# Patient Record
Sex: FEMALE | Race: WHITE | Employment: PART TIME | URBAN - METROPOLITAN AREA
[De-identification: names, ages, dates, MRNs, and addresses within clinical notes are randomized per-mention and may not be internally consistent; named-entity substitution may affect disease eponyms.]

---

## 2022-09-20 ENCOUNTER — TELEPHONE (OUTPATIENT)
Dept: UROLOGY | Age: 52
End: 2022-09-20

## 2022-09-20 ENCOUNTER — HOSPITAL ENCOUNTER (EMERGENCY)
Age: 52
Discharge: HOME | End: 2022-09-20
Attending: STUDENT IN AN ORGANIZED HEALTH CARE EDUCATION/TRAINING PROGRAM
Payer: COMMERCIAL

## 2022-09-20 ENCOUNTER — APPOINTMENT (OUTPATIENT)
Dept: CT IMAGING | Age: 52
End: 2022-09-20
Payer: COMMERCIAL

## 2022-09-20 VITALS
OXYGEN SATURATION: 94 % | HEART RATE: 76 BPM | BODY MASS INDEX: 29.89 KG/M2 | HEIGHT: 66 IN | SYSTOLIC BLOOD PRESSURE: 125 MMHG | RESPIRATION RATE: 15 BRPM | TEMPERATURE: 98.2 F | DIASTOLIC BLOOD PRESSURE: 82 MMHG | WEIGHT: 186 LBS

## 2022-09-20 DIAGNOSIS — N20.0 KIDNEY STONE: ICD-10-CM

## 2022-09-20 DIAGNOSIS — N30.01 ACUTE CYSTITIS WITH HEMATURIA: Primary | ICD-10-CM

## 2022-09-20 LAB
ALBUMIN SERPL-MCNC: 4.6 G/DL (ref 3.5–5)
ALBUMIN/GLOB SERPL: 1.6 {RATIO}
ALP SERPL-CCNC: 82 U/L (ref 45–117)
ALT SERPL-CCNC: 10 U/L (ref 13–61)
ANION GAP SERPL CALC-SCNC: 13 MMOL/L (ref 7–16)
APPEARANCE UR: ABNORMAL
AST SERPL-CCNC: 13 U/L (ref 15–37)
BACTERIA URNS QL MICRO: ABNORMAL /HPF
BASOPHILS # BLD: 0.1 K/UL (ref 0–0.2)
BASOPHILS NFR BLD: 1 % (ref 0–2)
BILIRUB SERPL-MCNC: 0.3 MG/DL (ref 0.2–1.1)
BILIRUB UR QL: NEGATIVE
BUN SERPL-MCNC: 17 MG/DL (ref 7–18)
CALCIUM SERPL-MCNC: 10 MG/DL (ref 8.3–10.4)
CASTS URNS QL MICRO: 0 /LPF
CHLORIDE SERPL-SCNC: 106 MMOL/L (ref 98–107)
CO2 SERPL-SCNC: 21 MMOL/L (ref 21–32)
COLOR UR: YELLOW
CREAT SERPL-MCNC: 0.44 MG/DL (ref 0.6–1)
CRYSTALS URNS QL MICRO: 0 /LPF
DIFFERENTIAL METHOD BLD: NORMAL
EOSINOPHIL # BLD: 0.2 K/UL (ref 0–0.8)
EOSINOPHIL NFR BLD: 2 % (ref 0.5–7.8)
EPI CELLS #/AREA URNS HPF: ABNORMAL /HPF
ERYTHROCYTE [DISTWIDTH] IN BLOOD BY AUTOMATED COUNT: 13 % (ref 11.9–14.6)
GLOBULIN SER CALC-MCNC: 2.8 G/DL (ref 2.3–3.5)
GLUCOSE SERPL-MCNC: 95 MG/DL (ref 65–100)
GLUCOSE UR STRIP.AUTO-MCNC: NEGATIVE MG/DL
HCT VFR BLD AUTO: 45.3 % (ref 35.8–46.3)
HGB BLD-MCNC: 15.4 G/DL (ref 11.7–15.4)
HGB UR QL STRIP: ABNORMAL
IMM GRANULOCYTES # BLD AUTO: 0 K/UL (ref 0–0.5)
IMM GRANULOCYTES NFR BLD AUTO: 0 % (ref 0–5)
KETONES UR QL STRIP.AUTO: NEGATIVE MG/DL
LEUKOCYTE ESTERASE UR QL STRIP.AUTO: ABNORMAL
LIPASE SERPL-CCNC: 34 U/L (ref 13–60)
LYMPHOCYTES # BLD: 1.5 K/UL (ref 0.5–4.6)
LYMPHOCYTES NFR BLD: 20 % (ref 13–44)
MCH RBC QN AUTO: 31.7 PG (ref 26.1–32.9)
MCHC RBC AUTO-ENTMCNC: 34 G/DL (ref 31.4–35)
MCV RBC AUTO: 93.2 FL (ref 79.6–97.8)
MONOCYTES # BLD: 0.8 K/UL (ref 0.1–1.3)
MONOCYTES NFR BLD: 10 % (ref 4–12)
MUCOUS THREADS URNS QL MICRO: 0 /LPF
NEUTS SEG # BLD: 4.9 K/UL (ref 1.7–8.2)
NEUTS SEG NFR BLD: 67 % (ref 43–78)
NITRITE UR QL STRIP.AUTO: NEGATIVE
NRBC # BLD: 0 K/UL (ref 0–0.2)
OTHER OBSERVATIONS: ABNORMAL
PH UR STRIP: 5.5 [PH] (ref 5–9)
PLATELET # BLD AUTO: 220 K/UL (ref 150–450)
PMV BLD AUTO: 9.6 FL (ref 9.4–12.3)
POTASSIUM SERPL-SCNC: 4.7 MMOL/L (ref 3.5–5.1)
PROT SERPL-MCNC: 7.4 G/DL (ref 6.4–8.2)
PROT UR STRIP-MCNC: 100 MG/DL
RBC # BLD AUTO: 4.86 M/UL (ref 4.05–5.2)
RBC #/AREA URNS HPF: >100 /HPF
SODIUM SERPL-SCNC: 140 MMOL/L (ref 136–145)
SP GR UR REFRACTOMETRY: >=1.03 (ref 1–1.02)
UROBILINOGEN UR QL STRIP.AUTO: 0.2 EU/DL (ref 0.2–1)
WBC # BLD AUTO: 7.4 K/UL (ref 4.3–11.1)
WBC URNS QL MICRO: >100 /HPF

## 2022-09-20 PROCEDURE — 2580000003 HC RX 258: Performed by: STUDENT IN AN ORGANIZED HEALTH CARE EDUCATION/TRAINING PROGRAM

## 2022-09-20 PROCEDURE — 96365 THER/PROPH/DIAG IV INF INIT: CPT

## 2022-09-20 PROCEDURE — 85025 COMPLETE CBC W/AUTO DIFF WBC: CPT

## 2022-09-20 PROCEDURE — 81003 URINALYSIS AUTO W/O SCOPE: CPT

## 2022-09-20 PROCEDURE — 99285 EMERGENCY DEPT VISIT HI MDM: CPT

## 2022-09-20 PROCEDURE — 6360000002 HC RX W HCPCS: Performed by: STUDENT IN AN ORGANIZED HEALTH CARE EDUCATION/TRAINING PROGRAM

## 2022-09-20 PROCEDURE — 83690 ASSAY OF LIPASE: CPT

## 2022-09-20 PROCEDURE — 96375 TX/PRO/DX INJ NEW DRUG ADDON: CPT

## 2022-09-20 PROCEDURE — 87086 URINE CULTURE/COLONY COUNT: CPT

## 2022-09-20 PROCEDURE — 6360000004 HC RX CONTRAST MEDICATION: Performed by: STUDENT IN AN ORGANIZED HEALTH CARE EDUCATION/TRAINING PROGRAM

## 2022-09-20 PROCEDURE — 80053 COMPREHEN METABOLIC PANEL: CPT

## 2022-09-20 PROCEDURE — 74177 CT ABD & PELVIS W/CONTRAST: CPT

## 2022-09-20 PROCEDURE — 81015 MICROSCOPIC EXAM OF URINE: CPT

## 2022-09-20 RX ORDER — KETOROLAC TROMETHAMINE 15 MG/ML
15 INJECTION, SOLUTION INTRAMUSCULAR; INTRAVENOUS ONCE
Status: COMPLETED | OUTPATIENT
Start: 2022-09-20 | End: 2022-09-20

## 2022-09-20 RX ORDER — SODIUM CHLORIDE 0.9 % (FLUSH) 0.9 %
5-40 SYRINGE (ML) INJECTION 2 TIMES DAILY
Status: DISCONTINUED | OUTPATIENT
Start: 2022-09-20 | End: 2022-09-20 | Stop reason: HOSPADM

## 2022-09-20 RX ORDER — HYDROCODONE BITARTRATE AND ACETAMINOPHEN 5; 325 MG/1; MG/1
1 TABLET ORAL EVERY 6 HOURS PRN
Qty: 10 TABLET | Refills: 0 | Status: SHIPPED | OUTPATIENT
Start: 2022-09-20 | End: 2022-09-23

## 2022-09-20 RX ORDER — 0.9 % SODIUM CHLORIDE 0.9 %
100 INTRAVENOUS SOLUTION INTRAVENOUS ONCE
Status: COMPLETED | OUTPATIENT
Start: 2022-09-20 | End: 2022-09-20

## 2022-09-20 RX ORDER — CEFPODOXIME PROXETIL 200 MG/1
200 TABLET, FILM COATED ORAL 2 TIMES DAILY
Qty: 20 TABLET | Refills: 0 | Status: SHIPPED | OUTPATIENT
Start: 2022-09-20 | End: 2022-09-30

## 2022-09-20 RX ADMIN — KETOROLAC TROMETHAMINE 15 MG: 15 INJECTION, SOLUTION INTRAMUSCULAR; INTRAVENOUS at 13:39

## 2022-09-20 RX ADMIN — SODIUM CHLORIDE, PRESERVATIVE FREE 10 ML: 5 INJECTION INTRAVENOUS at 13:35

## 2022-09-20 RX ADMIN — SODIUM CHLORIDE 100 ML: 9 INJECTION, SOLUTION INTRAVENOUS at 13:35

## 2022-09-20 RX ADMIN — CEFTRIAXONE 1000 MG: 1 INJECTION, POWDER, FOR SOLUTION INTRAMUSCULAR; INTRAVENOUS at 15:02

## 2022-09-20 RX ADMIN — IOPAMIDOL 100 ML: 755 INJECTION, SOLUTION INTRAVENOUS at 13:35

## 2022-09-20 ASSESSMENT — PAIN - FUNCTIONAL ASSESSMENT
PAIN_FUNCTIONAL_ASSESSMENT: ACTIVITIES ARE NOT PREVENTED
PAIN_FUNCTIONAL_ASSESSMENT: 0-10
PAIN_FUNCTIONAL_ASSESSMENT: 0-10

## 2022-09-20 ASSESSMENT — PAIN SCALES - GENERAL
PAINLEVEL_OUTOF10: 5
PAINLEVEL_OUTOF10: 4
PAINLEVEL_OUTOF10: 9

## 2022-09-20 ASSESSMENT — PAIN DESCRIPTION - ORIENTATION: ORIENTATION: LEFT

## 2022-09-20 ASSESSMENT — ENCOUNTER SYMPTOMS
ABDOMINAL PAIN: 1
PHOTOPHOBIA: 0
VOMITING: 1
SHORTNESS OF BREATH: 0
SORE THROAT: 0
NAUSEA: 1
DIARRHEA: 0
COUGH: 0

## 2022-09-20 ASSESSMENT — PAIN DESCRIPTION - ONSET: ONSET: AWAKENED FROM SLEEP

## 2022-09-20 ASSESSMENT — PAIN DESCRIPTION - DESCRIPTORS: DESCRIPTORS: ACHING;SHARP

## 2022-09-20 ASSESSMENT — PAIN DESCRIPTION - LOCATION
LOCATION: FLANK
LOCATION: FLANK

## 2022-09-20 ASSESSMENT — PAIN DESCRIPTION - FREQUENCY: FREQUENCY: CONTINUOUS

## 2022-09-20 NOTE — DISCHARGE INSTRUCTIONS
Take antibiotics as prescribed. Use Norco as needed for pain. Do not drive or operate heavy machinery as this medication can be sedating. Follow-up with nephrology tomorrow, they will call you to make a follow-up appoint. If you not hear from them by midmorning tomorrow, call their office.

## 2022-09-20 NOTE — TELEPHONE ENCOUNTER
Received page from ER about the patient listed below. She has L staghorn kidney that is symptomatic. Not clinically septic. ER setting up follow up with our office tomorrow (KEI Madrigal) to discuss next steps in stone management. I sent referral and scheduling request to our office. Douglas Alexandre M.D.     [facility]  [address], [facility] [address] [facility] [place]  Phone: [phone]  Fax: [phone]

## 2022-09-20 NOTE — ED NOTES
I have reviewed discharge instructions with the patient. The patient verbalized understanding. Patient left ED via Discharge Method: ambulatory to Home with Self. Opportunity for questions and clarification provided. Patient given 2 scripts. To continue your aftercare when you leave the hospital, you may receive an automated call from our care team to check in on how you are doing. This is a free service and part of our promise to provide the best care and service to meet your aftercare needs.  If you have questions, or wish to unsubscribe from this service please call [phone]. Thank you for Choosing our [address] [place] [address] Emergency Department.        Puma Burrows RN  09/20/22 1919

## 2022-09-20 NOTE — ED TRIAGE NOTES
Patient c/o flank pain. She had CT scan outside facility that showed kidney stone.    Unable to get an appointment with Urologist.

## 2022-09-20 NOTE — ED PROVIDER NOTES
Contrast? None    CBC with Auto Differential    CMP    Lipase    Urinalysis with Microscopic    Urinalysis    Urinalysis, Micro    Diet NPO    Saline lock IV        Medications   sodium chloride flush 0.9 % injection 5-40 mL (10 mLs IntraVENous Given 9/20/22 1335)   cefTRIAXone (ROCEPHIN) 1,000 mg in sodium chloride 0.9 % 50 mL IVPB mini-bag (1,000 mg IntraVENous New Bag 9/20/22 1502)   ketorolac (TORADOL) injection 15 mg (15 mg IntraVENous Given 9/20/22 1339)   0.9 % sodium chloride bolus (100 mLs IntraVENous New Bag 9/20/22 1335)   iopamidol (ISOVUE-370) 76 % injection 100 mL (100 mLs IntraVENous Given 9/20/22 1335)       New Prescriptions    CEFPODOXIME (VANTIN) 200 MG TABLET    Take 1 tablet by mouth 2 times daily for 10 days    HYDROCODONE-ACETAMINOPHEN (NORCO) 5-325 MG PER TABLET    Take 1 tablet by mouth every 6 hours as needed for Pain for up to 3 days. Intended supply: 3 days. Take lowest dose possible to manage pain        Jhon Overton is a 45 y.o. female who presents to the Emergency Department with chief complaint of  No chief complaint on file. 57-year-old female presents to the emergency department complaining of left flank pain that radiates into her abdomen. States has been present for multiple months. Had outpatient imaging, was diagnosed with staghorn calculi in the left kidney. States she has been trying to get in and see urology for the past 3 weeks and primary care physician has been sending referrals with no return call. Patient states she is tired of waiting so she came to the ER today. Reports continued and worsening pain to the left flank, when it gets severe it does cause her to get nauseated and vomit. Has been taking Aleve for symptoms. Does report previously taking 2 rounds of antibiotics few weeks ago but patient does not recall what antibiotics with exact timing of this. Denies fever, chills, diarrhea. Denies dysuria.         Review of Systems   Constitutional:  Negative for chills and fever. HENT:  Negative for sore throat. Eyes:  Negative for photophobia. Respiratory:  Negative for cough and shortness of breath. Cardiovascular:  Negative for chest pain. Gastrointestinal:  Positive for abdominal pain, nausea and vomiting. Negative for diarrhea. Genitourinary:  Positive for flank pain. Negative for dysuria. Musculoskeletal:  Negative for neck pain and neck stiffness. Skin:  Negative for rash. Neurological:  Negative for syncope and headaches. Psychiatric/Behavioral:  Negative for confusion. All other systems reviewed and are negative. No past medical history on file. No past surgical history on file. No family history on file. Social History     Socioeconomic History    Marital status:          Patient has no known allergies. Previous Medications    No medications on file        Vitals signs and nursing note reviewed. Patient Vitals for the past 4 hrs:   Pulse Resp BP SpO2   09/20/22 1453 76 15 125/82 94 %          Physical Exam  Vitals and nursing note reviewed. Constitutional:       General: She is not in acute distress. Appearance: Normal appearance. HENT:      Head: Normocephalic. Nose: Nose normal.      Mouth/Throat:      Mouth: Mucous membranes are moist.   Eyes:      Extraocular Movements: Extraocular movements intact. Cardiovascular:      Rate and Rhythm: Normal rate and regular rhythm. Pulses: Normal pulses. Heart sounds: Normal heart sounds. Pulmonary:      Effort: Pulmonary effort is normal. No respiratory distress. Breath sounds: Normal breath sounds. Abdominal:      General: Abdomen is flat. Palpations: Abdomen is soft. Tenderness: There is abdominal tenderness. There is left CVA tenderness. There is no right CVA tenderness, guarding or rebound. Musculoskeletal:         General: No swelling or tenderness. Normal range of motion. Cervical back: Normal range of motion. No rigidity. Skin:     General: Skin is warm. Findings: No rash. Neurological:      General: No focal deficit present. Mental Status: She is alert and oriented to person, place, and time. Psychiatric:         Mood and Affect: Mood normal.        Procedures    Results for orders placed or performed during the hospital encounter of 09/20/22   CT ABDOMEN PELVIS W IV CONTRAST Additional Contrast? None    Narrative    CT ABDOMEN AND PELVIS WITH INTRAVENOUS CONTRAST DATED 9/20/2022 3:02 PM.    History: 9/20/2022     Comparison: Left flank pain. Staghorn calculus. Technique:   Multiple contiguous helical CT images reconstructed at 5 mm  intervals were obtained from above the diaphragms through the ischial  tuberosities following 100 cc Isovue-370 without acute complication. All CT  scans performed at this facility use one or all of the following: Automated  exposure control, adjustment of the mA and/or kVp according to patient's size,  iterative reconstruction. Findings:  CT ABDOMEN:    Limited evaluation of the lung bases and base of the mediastinum demonstrates no  significant abnormalities. The Liver is homogeneous in attenuation. The spleen is homogeneous in  attenuation. No contour deforming or enhancing mass lesions are seen of the  pancreas or adrenal glands. The gallbladder has an unremarkable CT appearance  without radiopaque stones or pericholecystic fluid/inflammatory changes. No  acute changes are seen of the right kidney. There is a large stone within the  left renal pelvis measuring 2.2 cm x 0.9 cm. This obstructs the left kidney with  mild to moderate left hydronephrosis. In addition, there is clearly demonstrated  reactive urothelial thickening of the left renal pelvis some additional  urothelial thickening seen of the left renal pelvis and proximal left ureter. The visualized loops of small bowel and colon are normal in caliber.   The  appendix is seen on axial image 57 without acute abnormality. No free fluid,  free air, or focal inflammatory changes are seen in the abdomen. No adenopathy  is seen. The abdominal aorta is unremarkable in appearance. CT PELVIS:  No abnormal pelvic fluid collections or inflammatory changes are present. No  pelvic adenopathy is seen. The urinary bladder is unremarkable. A 1.8 and mid  right ovarian cyst is seen which is likely functional in a patient of this age. Impression    1. Large 2.2 cm x 0.9 cm stone in the left renal pelvis. This is likely  symptomatic in that this produces mild to moderate hydronephrosis of the left  kidney with clearly demonstrated reactive urothelial thickening seen of the left  renal pelvis and adjacent collecting system to include the proximal left ureter. No acute process is otherwise suggested.    CBC with Auto Differential   Result Value Ref Range    WBC 7.4 4.3 - 11.1 K/uL    RBC 4.86 4.05 - 5.20 M/uL    Hemoglobin 15.4 11.7 - 15.4 g/dL    Hematocrit 45.3 35.8 - 46.3 %    MCV 93.2 79.6 - 97.8 FL    MCH 31.7 26.1 - 32.9 PG    MCHC 34.0 31.4 - 35.0 g/dL    RDW 13.0 11.9 - 14.6 %    Platelets 735 915 - 721 K/uL    MPV 9.6 9.4 - 12.3 FL    nRBC 0.00 0.0 - 0.2 K/uL    Differential Type AUTOMATED      Seg Neutrophils 67 43 - 78 %    Lymphocytes 20 13 - 44 %    Monocytes 10 4.0 - 12.0 %    Eosinophils % 2 0.5 - 7.8 %    Basophils 1 0.0 - 2.0 %    Immature Granulocytes 0 0.0 - 5.0 %    Segs Absolute 4.9 1.7 - 8.2 K/UL    Absolute Lymph # 1.5 0.5 - 4.6 K/UL    Absolute Mono # 0.8 0.1 - 1.3 K/UL    Absolute Eos # 0.2 0.0 - 0.8 K/UL    Basophils Absolute 0.1 0.0 - 0.2 K/UL    Absolute Immature Granulocyte 0.0 0.0 - 0.5 K/UL   CMP   Result Value Ref Range    Sodium 140 136 - 145 mmol/L    Potassium 4.7 3.5 - 5.1 mmol/L    Chloride 106 98 - 107 mmol/L    CO2 21 21 - 32 mmol/L    Anion Gap 13 7.0 - 16.0 mmol/L    Glucose 95 65 - 100 mg/dL    BUN 17 7.0 - 18.0 MG/DL    Creatinine 0.44 (L) 0.6 - 1.0 MG/DL    GFR African American >192 >60 ml/min/1.73m2    GFR Non- >60 >60 ml/min/1.73m2    Calcium 10.0 8.3 - 10.4 MG/DL    Total Bilirubin 0.3 0.2 - 1.1 MG/DL    ALT 10 (L) 13.0 - 61.0 U/L    AST 13 (L) 15 - 37 U/L    Alk Phosphatase 82 45.0 - 117.0 U/L    Total Protein 7.4 6.4 - 8.2 g/dL    Albumin 4.6 3.5 - 5.0 g/dL    Globulin 2.8 2.3 - 3.5 g/dL    Albumin/Globulin Ratio 1.6     Lipase   Result Value Ref Range    Lipase 34 13 - 60 U/L   Urinalysis   Result Value Ref Range    Color, UA YELLOW      Appearance TURBID      Specific Gravity, UA >=1.030 1.001 - 1.023    pH, Urine 5.5 5.0 - 9.0      Protein,  (A) NEG mg/dL    Glucose, UA Negative mg/dL    Ketones, Urine Negative NEG mg/dL    Bilirubin Urine Negative NEG      Blood, Urine LARGE (A) NEG      Urobilinogen, Urine 0.2 0.2 - 1.0 EU/dL    Nitrite, Urine Negative NEG      Leukocyte Esterase, Urine SMALL (A) NEG     Urinalysis, Micro   Result Value Ref Range    WBC, UA >100 0 /hpf    RBC, UA >100 0 /hpf    Epithelial Cells UA 10-20 0 /hpf    BACTERIA, URINE 4+ (H) 0 /hpf    Casts 0 0 /lpf    Crystals 0 0 /LPF    Mucus, UA 0 0 /lpf    OTHER OBSERVATIONS RESULTS VERIFIED MANUALLY          CT ABDOMEN PELVIS W IV CONTRAST Additional Contrast? None   Final Result   1. Large 2.2 cm x 0.9 cm stone in the left renal pelvis. This is likely   symptomatic in that this produces mild to moderate hydronephrosis of the left   kidney with clearly demonstrated reactive urothelial thickening seen of the left   renal pelvis and adjacent collecting system to include the proximal left ureter. No acute process is otherwise suggested. Voice dictation software was used during the making of this note. This software is not perfect and grammatical and other typographical errors may be present. This note has not been completely proofread for errors.        Osvaldo Henderson DO  09/20/22 1418

## 2022-09-21 ENCOUNTER — TELEPHONE (OUTPATIENT)
Dept: UROLOGY | Age: 52
End: 2022-09-21

## 2022-09-21 ENCOUNTER — OFFICE VISIT (OUTPATIENT)
Dept: UROLOGY | Age: 52
End: 2022-09-21
Payer: COMMERCIAL

## 2022-09-21 DIAGNOSIS — N20.0 RENAL STONE: ICD-10-CM

## 2022-09-21 DIAGNOSIS — N30.01 ACUTE CYSTITIS WITH HEMATURIA: Primary | ICD-10-CM

## 2022-09-21 LAB
BILIRUBIN, URINE, POC: NEGATIVE
BLOOD URINE, POC: ABNORMAL
GLUCOSE URINE, POC: NEGATIVE
KETONES, URINE, POC: NEGATIVE
LEUKOCYTE ESTERASE, URINE, POC: ABNORMAL
NITRITE, URINE, POC: NEGATIVE
PH, URINE, POC: 5.5 (ref 4.6–8)
PROTEIN,URINE, POC: 100
SPECIFIC GRAVITY, URINE, POC: 1.03 (ref 1–1.03)
URINALYSIS CLARITY, POC: ABNORMAL
URINALYSIS COLOR, POC: ABNORMAL
UROBILINOGEN, POC: ABNORMAL

## 2022-09-21 PROCEDURE — 99204 OFFICE O/P NEW MOD 45 MIN: CPT | Performed by: NURSE PRACTITIONER

## 2022-09-21 PROCEDURE — 81003 URINALYSIS AUTO W/O SCOPE: CPT | Performed by: NURSE PRACTITIONER

## 2022-09-21 NOTE — PROGRESS NOTES
[facility]  [address] [facility]    [address]  [place], [address] [facility]  [phone]          Hayden Arenas  : 1970    Chief Complaint   Patient presents with    New Patient    Nephrolithiasis    Urinary Tract Infection          HPI     Jhon Fernandez is a 45 y.o. female referred for UTI and renal stones. Seen one day ago at [facility] for L flank pain. H/O staghorn calculi. CT a/p w contrast revealed 2.2cm x 0.9 cm L renal pelvis stone w obstruction. Images reviewed personally. Urine w ?infection. Culture pending. Prelim results w no growth. sCr 0.44. WBC 7.4    Today she reports cont L flank pain. No fever/chills. Pain is better w Norco. Occ n/v if pain is severe. No LUTS or gross hematuria. No prior h/o stones. She is adopted. No known family history. Current smoker. No past medical history on file. No past surgical history on file. Current Outpatient Medications   Medication Sig Dispense Refill    cefpodoxime (VANTIN) 200 MG tablet Take 1 tablet by mouth 2 times daily for 10 days 20 tablet 0    HYDROcodone-acetaminophen (NORCO) 5-325 MG per tablet Take 1 tablet by mouth every 6 hours as needed for Pain for up to 3 days. Intended supply: 3 days. Take lowest dose possible to manage pain 10 tablet 0     No current facility-administered medications for this visit.      No Known Allergies  Social History     Socioeconomic History    Marital status:      Spouse name: Not on file    Number of children: Not on file    Years of education: Not on file    Highest education level: Not on file   Occupational History    Not on file   Tobacco Use    Smoking status: Not on file    Smokeless tobacco: Not on file   Substance and Sexual Activity    Alcohol use: Not on file    Drug use: Not on file    Sexual activity: Not on file   Other Topics Concern    Not on file   Social History Narrative    Not on file     Social Determinants of Health     Financial Resource Strain: Not on file   Food Insecurity: Not on file   Transportation Needs: Not on file   Physical Activity: Not on file   Stress: Not on file   Social Connections: Not on file   Intimate Partner Violence: Not on file   Housing Stability: Not on file     No family history on file. Review of Systems  Constitutional: Negative  Genitourinary: Positive for urinary burning and flank pain (LEFT). Urinalysis  UA - Dipstick  Results for orders placed or performed in visit on 09/21/22   AMB POC URINALYSIS DIP STICK AUTO W/O MICRO   Result Value Ref Range    Color (UA POC)      Clarity (UA POC)      Glucose, Urine, POC Negative Negative    Bilirubin, Urine, POC Negative Negative    KETONES, Urine, POC Negative Negative    Specific Gravity, Urine, POC 1.030 1.001 - 1.035    Blood (UA POC) Large Negative    pH, Urine, POC 5.5 4.6 - 8.0    Protein, Urine,   Negative    Urobilinogen, POC 2 mg/dL     Nitrite, Urine, POC Negative Negative    Leukocyte Esterase, Urine, POC Small Negative     PHYSICAL EXAM    General appearance - well appearing and in no distress  Mental status - alert, oriented to person, place, and time  Neck - supple, no significant adenopathy   Chest/Lung-  Quiet, even and easy respiratory effort without use of accessory muscles  Skin - normal coloration and turgor, no rashes      KUB in office today reviewed by myself and shows NO stones. Assessment and Plan    ICD-10-CM    1. Acute cystitis with hematuria  N30.01 AMB POC URINALYSIS DIP STICK AUTO W/O MICRO     AMB POC XRAY ABDOMEN 1 VIEW      2. Renal stone  N20.0             Acute UTI- urine w hematuria. No infection. Culture is negative so far. Cont Vantin 200 mg PO BID. Renal stones- CT and KUB reviewed. ?uric acid stone. Tx options reviewed. She opts to have L PCNL. This will be arranged in the near future. Will go ahead and schedule H/P w MD.    Tayo Jackson to call sooner if needed.     María Elena Gurrola, FNP, APRN - CNP  Dr. Elysia Pemberton is supervising physician today and he approves plan of care.

## 2022-09-22 ENCOUNTER — TRANSCRIBE ORDERS (OUTPATIENT)
Dept: INTERVENTIONAL RADIOLOGY/VASCULAR | Age: 52
End: 2022-09-22

## 2022-09-22 DIAGNOSIS — N20.0 KIDNEY STONE: Primary | ICD-10-CM

## 2022-09-23 LAB
BACTERIA SPEC CULT: NORMAL
SERVICE CMNT-IMP: NORMAL

## 2022-10-05 ENCOUNTER — OFFICE VISIT (OUTPATIENT)
Dept: UROLOGY | Age: 52
End: 2022-10-05
Payer: COMMERCIAL

## 2022-10-05 DIAGNOSIS — N20.0 KIDNEY STONE: Primary | ICD-10-CM

## 2022-10-05 PROCEDURE — 99213 OFFICE O/P EST LOW 20 MIN: CPT | Performed by: UROLOGY

## 2022-10-05 ASSESSMENT — ENCOUNTER SYMPTOMS
RESPIRATORY NEGATIVE: 1
EYES NEGATIVE: 1

## 2022-10-05 NOTE — PROGRESS NOTES
[facility]  [address] [address]  [phone]    Niles Box  : 1970    Chief Complaint   Patient presents with    Follow-up        HPI     Jhon Alexander is a 45 y.o. female    No past medical history on file. No past surgical history on file. No current outpatient medications on file. No current facility-administered medications for this visit. No Known Allergies  Social History     Socioeconomic History    Marital status:      Spouse name: Not on file    Number of children: Not on file    Years of education: Not on file    Highest education level: Not on file   Occupational History    Not on file   Tobacco Use    Smoking status: Not on file    Smokeless tobacco: Not on file   Substance and Sexual Activity    Alcohol use: Not on file    Drug use: Not on file    Sexual activity: Not on file   Other Topics Concern    Not on file   Social History Narrative    Not on file     Social Determinants of Health     Financial Resource Strain: Not on file   Food Insecurity: Not on file   Transportation Needs: Not on file   Physical Activity: Not on file   Stress: Not on file   Social Connections: Not on file   Intimate Partner Violence: Not on file   Housing Stability: Not on file     No family history on file. Review of Systems  Constitutional: Negative  Skin: Negative skin ROS  Eyes: Eyes negative  ENT: HENT negative  Respiratory: Respiratory negative  Cardiovascular: Neg cardio ROS  GI: Neg GI ROS  Genitourinary: Genitourinary negative  Musculoskeletal: Musculoskeletal negative  Neurological: Neg neuro ROS  Psychological: Neg psych ROS  Endocrine: Endocrine negative  Hem/Lymphatic: Hematologic/lymphatic negative    There were no vitals taken for this visit. Epith - 0    Physical Exam    Assessment and Plan  No diagnosis found. No orders of the defined types were placed in this encounter.

## 2022-10-05 NOTE — PROGRESS NOTES
[facility]  [address] [address]  [phone]    Horace Young  : 1970    Chief Complaint   Patient presents with    Follow-up        HPI     Jhon Kessler is a 45 y.o. female referred for UTI and renal stones. Seen one day ago at [facility] for L flank pain. H/O staghorn calculi. CT a/p w contrast revealed 2.2cm x 0.9 cm L renal pelvis stone w obstruction. Images reviewed personally. Urine w ?infection. Culture showed no growth. Prelim results w no growth. sCr 0.44. WBC 7.4     Today she reports cont L flank pain. No fever/chills. Pain is better w Norco. Occ n/v if pain is severe. No LUTS or gross hematuria. No prior h/o stones. She is adopted. No known family history. Stone not visible on KUB. I am not able to retreive the KUB image. No past medical history on file. No past surgical history on file. No current outpatient medications on file. No current facility-administered medications for this visit. No Known Allergies  Social History     Socioeconomic History    Marital status:      Spouse name: Not on file    Number of children: Not on file    Years of education: Not on file    Highest education level: Not on file   Occupational History    Not on file   Tobacco Use    Smoking status: Not on file    Smokeless tobacco: Not on file   Substance and Sexual Activity    Alcohol use: Not on file    Drug use: Not on file    Sexual activity: Not on file   Other Topics Concern    Not on file   Social History Narrative    Not on file     Social Determinants of Health     Financial Resource Strain: Not on file   Food Insecurity: Not on file   Transportation Needs: Not on file   Physical Activity: Not on file   Stress: Not on file   Social Connections: Not on file   Intimate Partner Violence: Not on file   Housing Stability: Not on file     No family history on file. ROS    There were no vitals taken for this visit.   Physical Exam  General Mental Status - Patient is alert and oriented X3. Build & Nutrition - Well nourished. Chest and Lung Exam   Chest and lung exam reveals  - normal excursion with symmetric chest walls, quiet, even and easy respiratory effort with no use of accessory muscles and on auscultation, normal breath sounds, no adventitious sounds and normal vocal resonance. Cardiovascular   Cardiovascular examination reveals  - normal heart sounds, regular rate and rhythm with no murmurs. Abdomen   Palpation/Percussion: Palpation and Percussion of the abdomen reveal - Non Tender, No Rebound tenderness, No Rigidity (guarding), No hepatosplenomegaly, No Palpable abdominal masses and Soft. Hernia - Bilateral - No Hernia(s) present. Urinalysis  UA - Dipstick  Results for orders placed or performed in visit on 09/21/22   AMB POC URINALYSIS DIP STICK AUTO W/O MICRO   Result Value Ref Range    Color (UA POC)      Clarity (UA POC)      Glucose, Urine, POC Negative Negative    Bilirubin, Urine, POC Negative Negative    KETONES, Urine, POC Negative Negative    Specific Gravity, Urine, POC 1.030 1.001 - 1.035    Blood (UA POC) Large Negative    pH, Urine, POC 5.5 4.6 - 8.0    Protein, Urine,  Negative    Urobilinogen, POC 2 mg/dL     Nitrite, Urine, POC Negative Negative    Leukocyte Esterase, Urine, POC Small Negative       UA - Micro  WBC - 0  RBC - 0  Bacteria - 0  Epith - 0    Assessment and Plan   Diagnosis Orders   1. Kidney stone  CBC with Auto Differential    Protime-INR    Urinalysis    Culture, Urine        Left PNL scheduled for 11-1-22. Discussed risks of bleeding, infection, pneumothorax. I told her we could place a stent if she develops pain. No orders of the defined types were placed in this encounter. Follow-up and Dispositions    Return for keep previous appt. Return for keep previous appt.

## 2022-10-25 ENCOUNTER — HOSPITAL ENCOUNTER (OUTPATIENT)
Dept: PREADMISSION TESTING | Age: 52
Discharge: HOME | End: 2022-10-28
Payer: COMMERCIAL

## 2022-10-25 VITALS
BODY MASS INDEX: 30.68 KG/M2 | SYSTOLIC BLOOD PRESSURE: 134 MMHG | WEIGHT: 190.9 LBS | OXYGEN SATURATION: 96 % | HEIGHT: 66 IN | TEMPERATURE: 97.9 F | HEART RATE: 93 BPM | DIASTOLIC BLOOD PRESSURE: 74 MMHG | RESPIRATION RATE: 18 BRPM

## 2022-10-25 DIAGNOSIS — N20.0 KIDNEY STONE: ICD-10-CM

## 2022-10-25 LAB
APPEARANCE UR: ABNORMAL
BACTERIA URNS QL MICRO: ABNORMAL /HPF
BASOPHILS # BLD: 0.1 K/UL (ref 0–0.2)
BASOPHILS NFR BLD: 1 % (ref 0–2)
BILIRUB UR QL: NEGATIVE
CASTS URNS QL MICRO: ABNORMAL /LPF
COLOR UR: ABNORMAL
DIFFERENTIAL METHOD BLD: NORMAL
EOSINOPHIL # BLD: 0.1 K/UL (ref 0–0.8)
EOSINOPHIL NFR BLD: 2 % (ref 0.5–7.8)
EPI CELLS #/AREA URNS HPF: ABNORMAL /HPF
ERYTHROCYTE [DISTWIDTH] IN BLOOD BY AUTOMATED COUNT: 13.2 % (ref 11.9–14.6)
GLUCOSE UR STRIP.AUTO-MCNC: NEGATIVE MG/DL
HCT VFR BLD AUTO: 41.7 % (ref 35.8–46.3)
HGB BLD-MCNC: 13.9 G/DL (ref 11.7–15.4)
HGB UR QL STRIP: ABNORMAL
IMM GRANULOCYTES # BLD AUTO: 0 K/UL (ref 0–0.5)
IMM GRANULOCYTES NFR BLD AUTO: 0 % (ref 0–5)
INR PPP: 0.9
KETONES UR QL STRIP.AUTO: NEGATIVE MG/DL
LEUKOCYTE ESTERASE UR QL STRIP.AUTO: ABNORMAL
LYMPHOCYTES # BLD: 1.3 K/UL (ref 0.5–4.6)
LYMPHOCYTES NFR BLD: 20 % (ref 13–44)
MCH RBC QN AUTO: 31.4 PG (ref 26.1–32.9)
MCHC RBC AUTO-ENTMCNC: 33.3 G/DL (ref 31.4–35)
MCV RBC AUTO: 94.1 FL (ref 82–102)
MONOCYTES # BLD: 0.6 K/UL (ref 0.1–1.3)
MONOCYTES NFR BLD: 9 % (ref 4–12)
NEUTS SEG # BLD: 4.6 K/UL (ref 1.7–8.2)
NEUTS SEG NFR BLD: 68 % (ref 43–78)
NITRITE UR QL STRIP.AUTO: NEGATIVE
NRBC # BLD: 0 K/UL (ref 0–0.2)
PH UR STRIP: 5 [PH] (ref 5–9)
PLATELET # BLD AUTO: 205 K/UL (ref 150–450)
PMV BLD AUTO: 10 FL (ref 9.4–12.3)
PROT UR STRIP-MCNC: 30 MG/DL
PROTHROMBIN TIME: 12.9 SEC (ref 12.6–14.3)
RBC # BLD AUTO: 4.43 M/UL (ref 4.05–5.2)
RBC #/AREA URNS HPF: ABNORMAL /HPF
SP GR UR REFRACTOMETRY: 1.02 (ref 1–1.02)
UROBILINOGEN UR QL STRIP.AUTO: 0.2 EU/DL (ref 0.2–1)
WBC # BLD AUTO: 6.7 K/UL (ref 4.3–11.1)
WBC URNS QL MICRO: ABNORMAL /HPF

## 2022-10-25 PROCEDURE — 85025 COMPLETE CBC W/AUTO DIFF WBC: CPT

## 2022-10-25 PROCEDURE — 85610 PROTHROMBIN TIME: CPT

## 2022-10-25 PROCEDURE — 87086 URINE CULTURE/COLONY COUNT: CPT

## 2022-10-25 PROCEDURE — 81001 URINALYSIS AUTO W/SCOPE: CPT

## 2022-10-25 RX ORDER — IBUPROFEN 200 MG
200 TABLET ORAL EVERY 6 HOURS PRN
COMMUNITY
End: 2022-11-09

## 2022-10-25 RX ORDER — LISINOPRIL 10 MG/1
TABLET ORAL DAILY
COMMUNITY
Start: 2022-10-05

## 2022-10-25 RX ORDER — NAPROXEN 250 MG/1
250 TABLET ORAL 2 TIMES DAILY WITH MEALS
COMMUNITY
End: 2022-11-09

## 2022-10-25 RX ORDER — TRAMADOL HYDROCHLORIDE 50 MG/1
TABLET ORAL EVERY 6 HOURS PRN
COMMUNITY
Start: 2022-10-12 | End: 2022-11-09

## 2022-10-25 RX ORDER — ROSUVASTATIN CALCIUM 5 MG/1
5 TABLET, COATED ORAL NIGHTLY
COMMUNITY
Start: 2022-10-13

## 2022-10-25 RX ORDER — ALBUTEROL SULFATE 90 UG/1
2 AEROSOL, METERED RESPIRATORY (INHALATION) EVERY 6 HOURS PRN
COMMUNITY
Start: 2022-08-15

## 2022-10-25 ASSESSMENT — PAIN DESCRIPTION - LOCATION: LOCATION: FLANK

## 2022-10-25 ASSESSMENT — PAIN DESCRIPTION - FREQUENCY: FREQUENCY: INTERMITTENT

## 2022-10-25 ASSESSMENT — PAIN DESCRIPTION - ORIENTATION: ORIENTATION: LEFT

## 2022-10-25 NOTE — PRE-PROCEDURE INSTRUCTIONS
PLEASE CONTINUE TAKING ALL PRESCRIPTION MEDICATIONS UP TO THE DAY OF SURGERY UNLESS OTHERWISE DIRECTED BELOW. DISCONTINUE all vitamins and supplements 7 days prior to surgery. DISCONTINUE Non-Steriodal Anti-Inflammatory (NSAIDS) such as Advil and Aleve 5 days prior to surgery. Home Medications to take  the day of surgery   Tramadol as needed for pain  Albuterol Inhaler as needed and bring it with you day of procedure. Home Medications   to Hold   Lisinopril        Comments       On the day before surgery please take Acetaminophen 1000mg in the morning and then again before bed. You may substitute for Tylenol 650 mg. Please do not bring home medications with you on the day of surgery unless otherwise directed by your nurse. If you are instructed to bring home medications, please give them to your nurse as they will be administered by the nursing staff. If you have any questions, please call [facility] Leigh Jimenez [phone] or [facility] [phone]. A copy of this note was provided to the patient for reference.

## 2022-10-25 NOTE — PRE-PROCEDURE INSTRUCTIONS
Latest Reference Range & Units 10/25/22 09:44   WBC 4.3 - 11.1 K/uL 6.7   RBC 4.05 - 5.2 M/uL 4.43   Hemoglobin Quant 11.7 - 15.4 g/dL 13.9   Hematocrit 35.8 - 46.3 % 41.7   MCV 82.0 - 102.0 FL 94.1   MCH 26.1 - 32.9 PG 31.4   MCHC 31.4 - 35.0 g/dL 33.3   MPV 9.4 - 12.3 FL 10.0   RDW 11.9 - 14.6 % 13.2   Platelet Count 472 - 450 K/uL 205   Absolute Mono # 0.1 - 1.3 K/UL 0.6   Eosinophils % 0.5 - 7.8 % 2   Basophils Absolute 0.0 - 0.2 K/UL 0.1   Differential Type -   AUTOMATED   Seg Neutrophils 43 - 78 % 68   Segs Absolute 1.7 - 8.2 K/UL 4.6   Lymphocytes 13 - 44 % 20   Absolute Lymph # 0.5 - 4.6 K/UL 1.3   Monocytes 4.0 - 12.0 % 9   Absolute Eos # 0.0 - 0.8 K/UL 0.1   Basophils 0.0 - 2.0 % 1   Immature Granulocytes 0.0 - 5.0 % 0   Nucleated Red Blood Cells 0.0 - 0.2 K/uL 0.00   Absolute Immature Granulocyte 0.0 - 0.5 K/UL 0.0   Prothrombin Time 12.6 - 14.3 sec 12.9   INR -   0.9   CULTURE, URINE  Rpt   Color, UA -   YELLOW/STRAW   Glucose, UA mg/dL Negative   Bilirubin, Urine NEG   Negative   Ketones, Urine NEG mg/dL Negative   Specific Gravity, UA 1.001 - 1.023   1.024 (H)   Blood, Urine NEG   LARGE ! Protein, UA NEG mg/dL 30 ! Urobilinogen, Urine 0.2 - 1.0 EU/dL 0.2   Nitrite, Urine NEG   Negative   Leukocyte Esterase, Urine NEG   MODERATE ! Appearance -   CLOUDY   pH, Urine 5.0 - 9.0   5.0   Casts 0 /lpf 0-3   WBC, UA 0 /hpf 20-50   RBC, UA 0 /hpf 10-20   Epithelial Cells, UA 0 /hpf 5-10   Bacteria, UA 0 /hpf 1+ (H)   (H): Data is abnormally high  !: Data is abnormal  Rpt: View report in Results Review for more information  Epic message sent to ANA LILIA Jackson in Dr. Gunn Cam office. Labs routed to Dr. Correa Courser.

## 2022-10-25 NOTE — PRE-PROCEDURE INSTRUCTIONS
Patient verified name and     Order for consent was found in EHR and matches case posting; patient verified. Type IB surgery, walk in assessment complete. Labs per surgeon: UA, UCX, CBC, PT/INR. Labs per anesthesia protocol: none. EKG: none per protocol. Chart  completed for urine culture. MRSA/MSSA swab collected; pharmacy to review and dose antibiotic as appropriate. Hospital approved surgical skin cleanser and instructions given per hospital policy. Patient provided with and instructed on educational handouts including Guide to Surgery, Pain Management, Hand Hygiene, Blood Transfusion Education, and [employer] [facility] Brochure. Patient answered medical/surgical history questions at their best of ability. All prior to admission medications documented in [facility]. Original medication prescription bottle were visualized during patient appointment. Patient instructed to hold all vitamins 7 days prior to surgery and NSAIDS 5 days prior to surgery, patient verbalized understanding. Patient teach back successful and patient demonstrates knowledge of instructions.

## 2022-10-27 LAB
BACTERIA SPEC CULT: NORMAL
SERVICE CMNT-IMP: NORMAL

## 2022-10-31 ENCOUNTER — ANESTHESIA EVENT (OUTPATIENT)
Dept: SURGERY | Age: 52
End: 2022-10-31
Payer: COMMERCIAL

## 2022-10-31 NOTE — PERIOP NOTE
Directly informed patient and or family member of pre op arrival time 0830 on 11/1/22. All questions answered. Pre op instructions reviewed. Left contact information for any additional questions or needs.

## 2022-11-01 ENCOUNTER — APPOINTMENT (OUTPATIENT)
Dept: GENERAL RADIOLOGY | Age: 52
End: 2022-11-01
Attending: UROLOGY
Payer: COMMERCIAL

## 2022-11-01 ENCOUNTER — HOSPITAL ENCOUNTER (OUTPATIENT)
Dept: INTERVENTIONAL RADIOLOGY/VASCULAR | Age: 52
Discharge: HOME | End: 2022-11-04
Payer: COMMERCIAL

## 2022-11-01 ENCOUNTER — ANESTHESIA (OUTPATIENT)
Dept: SURGERY | Age: 52
End: 2022-11-01
Payer: COMMERCIAL

## 2022-11-01 ENCOUNTER — HOSPITAL ENCOUNTER (OUTPATIENT)
Age: 52
Setting detail: OBSERVATION
Discharge: HOME | End: 2022-11-03
Attending: UROLOGY | Admitting: UROLOGY
Payer: COMMERCIAL

## 2022-11-01 VITALS
HEART RATE: 80 BPM | BODY MASS INDEX: 30.7 KG/M2 | TEMPERATURE: 97.9 F | DIASTOLIC BLOOD PRESSURE: 87 MMHG | SYSTOLIC BLOOD PRESSURE: 166 MMHG | HEIGHT: 66 IN | WEIGHT: 191 LBS | RESPIRATION RATE: 16 BRPM | OXYGEN SATURATION: 96 %

## 2022-11-01 DIAGNOSIS — N20.0 LEFT RENAL STONE: Primary | ICD-10-CM

## 2022-11-01 DIAGNOSIS — N20.0 KIDNEY STONE: ICD-10-CM

## 2022-11-01 LAB
HCG UR QL: NEGATIVE
HCT VFR BLD AUTO: 41.1 % (ref 35.8–46.3)
HGB BLD-MCNC: 13.6 G/DL (ref 11.7–15.4)

## 2022-11-01 PROCEDURE — 6360000002 HC RX W HCPCS: Performed by: ANESTHESIOLOGY

## 2022-11-01 PROCEDURE — 2709999900 IR GUIDED NEPHROSTOMY CATH PLACEMENT LEFT

## 2022-11-01 PROCEDURE — 50081 PERQ NL/PL LITHOTRP CPLX>2CM: CPT | Performed by: UROLOGY

## 2022-11-01 PROCEDURE — 2500000003 HC RX 250 WO HCPCS: Performed by: RADIOLOGY

## 2022-11-01 PROCEDURE — 2720000010 HC SURG SUPPLY STERILE: Performed by: UROLOGY

## 2022-11-01 PROCEDURE — C1726 CATH, BAL DIL, NON-VASCULAR: HCPCS | Performed by: UROLOGY

## 2022-11-01 PROCEDURE — C1769 GUIDE WIRE: HCPCS

## 2022-11-01 PROCEDURE — 85014 HEMATOCRIT: CPT

## 2022-11-01 PROCEDURE — 2580000003 HC RX 258: Performed by: ANESTHESIOLOGY

## 2022-11-01 PROCEDURE — G0378 HOSPITAL OBSERVATION PER HR: HCPCS

## 2022-11-01 PROCEDURE — 2709999900 HC NON-CHARGEABLE SUPPLY: Performed by: UROLOGY

## 2022-11-01 PROCEDURE — 82355 CALCULUS ANALYSIS QUAL: CPT

## 2022-11-01 PROCEDURE — 3700000001 HC ADD 15 MINUTES (ANESTHESIA): Performed by: UROLOGY

## 2022-11-01 PROCEDURE — 2580000003 HC RX 258: Performed by: UROLOGY

## 2022-11-01 PROCEDURE — 52332 CYSTOSCOPY AND TREATMENT: CPT | Performed by: UROLOGY

## 2022-11-01 PROCEDURE — 6360000004 HC RX CONTRAST MEDICATION: Performed by: RADIOLOGY

## 2022-11-01 PROCEDURE — C1769 GUIDE WIRE: HCPCS | Performed by: UROLOGY

## 2022-11-01 PROCEDURE — 3600000014 HC SURGERY LEVEL 4 ADDTL 15MIN: Performed by: UROLOGY

## 2022-11-01 PROCEDURE — 50553 KIDNEY ENDOSCOPY: CPT | Performed by: UROLOGY

## 2022-11-01 PROCEDURE — 3700000000 HC ANESTHESIA ATTENDED CARE: Performed by: UROLOGY

## 2022-11-01 PROCEDURE — 6370000000 HC RX 637 (ALT 250 FOR IP): Performed by: ANESTHESIOLOGY

## 2022-11-01 PROCEDURE — 2500000003 HC RX 250 WO HCPCS

## 2022-11-01 PROCEDURE — 7100000001 HC PACU RECOVERY - ADDTL 15 MIN: Performed by: UROLOGY

## 2022-11-01 PROCEDURE — 7100000000 HC PACU RECOVERY - FIRST 15 MIN: Performed by: UROLOGY

## 2022-11-01 PROCEDURE — 6360000002 HC RX W HCPCS

## 2022-11-01 PROCEDURE — 3600000004 HC SURGERY LEVEL 4 BASE: Performed by: UROLOGY

## 2022-11-01 PROCEDURE — 81025 URINE PREGNANCY TEST: CPT

## 2022-11-01 PROCEDURE — 6360000002 HC RX W HCPCS: Performed by: RADIOLOGY

## 2022-11-01 PROCEDURE — C2617 STENT, NON-COR, TEM W/O DEL: HCPCS | Performed by: UROLOGY

## 2022-11-01 PROCEDURE — 88300 SURGICAL PATH GROSS: CPT

## 2022-11-01 PROCEDURE — A4217 STERILE WATER/SALINE, 500 ML: HCPCS | Performed by: UROLOGY

## 2022-11-01 PROCEDURE — 6370000000 HC RX 637 (ALT 250 FOR IP): Performed by: UROLOGY

## 2022-11-01 PROCEDURE — C1894 INTRO/SHEATH, NON-LASER: HCPCS | Performed by: UROLOGY

## 2022-11-01 DEVICE — URETERAL STENT
Type: IMPLANTABLE DEVICE | Site: SACRUM | Status: FUNCTIONAL
Brand: PERCUFLEX™ PLUS

## 2022-11-01 RX ORDER — SODIUM CHLORIDE, SODIUM LACTATE, POTASSIUM CHLORIDE, CALCIUM CHLORIDE 600; 310; 30; 20 MG/100ML; MG/100ML; MG/100ML; MG/100ML
INJECTION, SOLUTION INTRAVENOUS CONTINUOUS
Status: DISCONTINUED | OUTPATIENT
Start: 2022-11-01 | End: 2022-11-01

## 2022-11-01 RX ORDER — LIDOCAINE HYDROCHLORIDE 10 MG/ML
1 INJECTION, SOLUTION INFILTRATION; PERINEURAL
Status: DISCONTINUED | OUTPATIENT
Start: 2022-11-01 | End: 2022-11-01 | Stop reason: HOSPADM

## 2022-11-01 RX ORDER — SODIUM CHLORIDE, SODIUM LACTATE, POTASSIUM CHLORIDE, CALCIUM CHLORIDE 600; 310; 30; 20 MG/100ML; MG/100ML; MG/100ML; MG/100ML
INJECTION, SOLUTION INTRAVENOUS CONTINUOUS
Status: DISCONTINUED | OUTPATIENT
Start: 2022-11-01 | End: 2022-11-01 | Stop reason: HOSPADM

## 2022-11-01 RX ORDER — DEXTROSE MONOHYDRATE 100 MG/ML
INJECTION, SOLUTION INTRAVENOUS CONTINUOUS PRN
Status: DISCONTINUED | OUTPATIENT
Start: 2022-11-01 | End: 2022-11-01 | Stop reason: HOSPADM

## 2022-11-01 RX ORDER — HYDROMORPHONE HYDROCHLORIDE 2 MG/ML
0.5 INJECTION, SOLUTION INTRAMUSCULAR; INTRAVENOUS; SUBCUTANEOUS EVERY 5 MIN PRN
Status: DISCONTINUED | OUTPATIENT
Start: 2022-11-01 | End: 2022-11-01 | Stop reason: HOSPADM

## 2022-11-01 RX ORDER — SODIUM CHLORIDE 9 MG/ML
INJECTION, SOLUTION INTRAVENOUS PRN
Status: DISCONTINUED | OUTPATIENT
Start: 2022-11-01 | End: 2022-11-01 | Stop reason: HOSPADM

## 2022-11-01 RX ORDER — SODIUM CHLORIDE 0.9 % (FLUSH) 0.9 %
5-40 SYRINGE (ML) INJECTION EVERY 12 HOURS SCHEDULED
Status: DISCONTINUED | OUTPATIENT
Start: 2022-11-01 | End: 2022-11-01 | Stop reason: HOSPADM

## 2022-11-01 RX ORDER — MAGNESIUM HYDROXIDE 1200 MG/15ML
LIQUID ORAL PRN
Status: DISCONTINUED | OUTPATIENT
Start: 2022-11-01 | End: 2022-11-01 | Stop reason: HOSPADM

## 2022-11-01 RX ORDER — OXYCODONE HYDROCHLORIDE 5 MG/1
5 TABLET ORAL EVERY 4 HOURS PRN
Status: DISCONTINUED | OUTPATIENT
Start: 2022-11-01 | End: 2022-11-03 | Stop reason: HOSPADM

## 2022-11-01 RX ORDER — ACETAMINOPHEN 160 MG/5ML
650 SUSPENSION, ORAL (FINAL DOSE FORM) ORAL EVERY 4 HOURS PRN
Status: DISCONTINUED | OUTPATIENT
Start: 2022-11-01 | End: 2022-11-03 | Stop reason: HOSPADM

## 2022-11-01 RX ORDER — MIDAZOLAM HYDROCHLORIDE 2 MG/2ML
2 INJECTION, SOLUTION INTRAMUSCULAR; INTRAVENOUS
Status: DISCONTINUED | OUTPATIENT
Start: 2022-11-01 | End: 2022-11-01 | Stop reason: HOSPADM

## 2022-11-01 RX ORDER — MIDAZOLAM HYDROCHLORIDE 2 MG/2ML
INJECTION, SOLUTION INTRAMUSCULAR; INTRAVENOUS
Status: COMPLETED | OUTPATIENT
Start: 2022-11-01 | End: 2022-11-01

## 2022-11-01 RX ORDER — OXYCODONE HYDROCHLORIDE 5 MG/1
10 TABLET ORAL EVERY 4 HOURS PRN
Status: DISCONTINUED | OUTPATIENT
Start: 2022-11-01 | End: 2022-11-03 | Stop reason: HOSPADM

## 2022-11-01 RX ORDER — HYDROMORPHONE HYDROCHLORIDE 1 MG/ML
INJECTION, SOLUTION INTRAMUSCULAR; INTRAVENOUS; SUBCUTANEOUS PRN
Status: DISCONTINUED | OUTPATIENT
Start: 2022-11-01 | End: 2022-11-01 | Stop reason: SDUPTHER

## 2022-11-01 RX ORDER — FENTANYL CITRATE 50 UG/ML
INJECTION, SOLUTION INTRAMUSCULAR; INTRAVENOUS
Status: COMPLETED | OUTPATIENT
Start: 2022-11-01 | End: 2022-11-01

## 2022-11-01 RX ORDER — SODIUM CHLORIDE 0.9 % (FLUSH) 0.9 %
5-40 SYRINGE (ML) INJECTION PRN
Status: DISCONTINUED | OUTPATIENT
Start: 2022-11-01 | End: 2022-11-01 | Stop reason: HOSPADM

## 2022-11-01 RX ORDER — PROCHLORPERAZINE EDISYLATE 5 MG/ML
5 INJECTION INTRAMUSCULAR; INTRAVENOUS
Status: DISCONTINUED | OUTPATIENT
Start: 2022-11-01 | End: 2022-11-01 | Stop reason: HOSPADM

## 2022-11-01 RX ORDER — SODIUM CHLORIDE 9 MG/ML
INJECTION, SOLUTION INTRAVENOUS CONTINUOUS
Status: DISCONTINUED | OUTPATIENT
Start: 2022-11-01 | End: 2022-11-03 | Stop reason: HOSPADM

## 2022-11-01 RX ORDER — ACETAMINOPHEN 500 MG
1000 TABLET ORAL ONCE
Status: COMPLETED | OUTPATIENT
Start: 2022-11-01 | End: 2022-11-01

## 2022-11-01 RX ORDER — MAGNESIUM HYDROXIDE 1200 MG/15ML
LIQUID ORAL CONTINUOUS PRN
Status: DISCONTINUED | OUTPATIENT
Start: 2022-11-01 | End: 2022-11-01 | Stop reason: HOSPADM

## 2022-11-01 RX ORDER — LIDOCAINE HYDROCHLORIDE 20 MG/ML
INJECTION, SOLUTION EPIDURAL; INFILTRATION; INTRACAUDAL; PERINEURAL PRN
Status: DISCONTINUED | OUTPATIENT
Start: 2022-11-01 | End: 2022-11-01 | Stop reason: SDUPTHER

## 2022-11-01 RX ORDER — ROSUVASTATIN CALCIUM 5 MG/1
5 TABLET, COATED ORAL NIGHTLY
Status: DISCONTINUED | OUTPATIENT
Start: 2022-11-01 | End: 2022-11-03 | Stop reason: HOSPADM

## 2022-11-01 RX ORDER — ALBUTEROL SULFATE 90 UG/1
2 AEROSOL, METERED RESPIRATORY (INHALATION) EVERY 6 HOURS PRN
Status: DISCONTINUED | OUTPATIENT
Start: 2022-11-01 | End: 2022-11-03 | Stop reason: HOSPADM

## 2022-11-01 RX ORDER — HYDROMORPHONE HCL 110MG/55ML
1 PATIENT CONTROLLED ANALGESIA SYRINGE INTRAVENOUS
Status: COMPLETED | OUTPATIENT
Start: 2022-11-01 | End: 2022-11-01

## 2022-11-01 RX ORDER — LISINOPRIL 5 MG/1
2.5 TABLET ORAL DAILY
Status: DISCONTINUED | OUTPATIENT
Start: 2022-11-01 | End: 2022-11-03 | Stop reason: HOSPADM

## 2022-11-01 RX ORDER — DEXAMETHASONE SODIUM PHOSPHATE 4 MG/ML
INJECTION, SOLUTION INTRA-ARTICULAR; INTRALESIONAL; INTRAMUSCULAR; INTRAVENOUS; SOFT TISSUE PRN
Status: DISCONTINUED | OUTPATIENT
Start: 2022-11-01 | End: 2022-11-01 | Stop reason: SDUPTHER

## 2022-11-01 RX ORDER — PROPOFOL 10 MG/ML
INJECTION, EMULSION INTRAVENOUS PRN
Status: DISCONTINUED | OUTPATIENT
Start: 2022-11-01 | End: 2022-11-01 | Stop reason: SDUPTHER

## 2022-11-01 RX ORDER — DIPHENHYDRAMINE HYDROCHLORIDE 50 MG/ML
12.5 INJECTION INTRAMUSCULAR; INTRAVENOUS
Status: DISCONTINUED | OUTPATIENT
Start: 2022-11-01 | End: 2022-11-01 | Stop reason: HOSPADM

## 2022-11-01 RX ORDER — ONDANSETRON 2 MG/ML
4 INJECTION INTRAMUSCULAR; INTRAVENOUS
Status: COMPLETED | OUTPATIENT
Start: 2022-11-01 | End: 2022-11-01

## 2022-11-01 RX ORDER — ROCURONIUM BROMIDE 10 MG/ML
INJECTION, SOLUTION INTRAVENOUS PRN
Status: DISCONTINUED | OUTPATIENT
Start: 2022-11-01 | End: 2022-11-01 | Stop reason: SDUPTHER

## 2022-11-01 RX ORDER — ONDANSETRON 2 MG/ML
4 INJECTION INTRAMUSCULAR; INTRAVENOUS EVERY 6 HOURS PRN
Status: DISCONTINUED | OUTPATIENT
Start: 2022-11-01 | End: 2022-11-03 | Stop reason: HOSPADM

## 2022-11-01 RX ORDER — NEOSTIGMINE METHYLSULFATE 1 MG/ML
INJECTION, SOLUTION INTRAVENOUS PRN
Status: DISCONTINUED | OUTPATIENT
Start: 2022-11-01 | End: 2022-11-01 | Stop reason: SDUPTHER

## 2022-11-01 RX ORDER — GLYCOPYRROLATE 0.2 MG/ML
INJECTION INTRAMUSCULAR; INTRAVENOUS PRN
Status: DISCONTINUED | OUTPATIENT
Start: 2022-11-01 | End: 2022-11-01 | Stop reason: SDUPTHER

## 2022-11-01 RX ORDER — OXYCODONE HYDROCHLORIDE 5 MG/1
5 TABLET ORAL
Status: DISCONTINUED | OUTPATIENT
Start: 2022-11-01 | End: 2022-11-01 | Stop reason: HOSPADM

## 2022-11-01 RX ORDER — ONDANSETRON 2 MG/ML
INJECTION INTRAMUSCULAR; INTRAVENOUS PRN
Status: DISCONTINUED | OUTPATIENT
Start: 2022-11-01 | End: 2022-11-01 | Stop reason: SDUPTHER

## 2022-11-01 RX ADMIN — SODIUM CHLORIDE: 9 INJECTION, SOLUTION INTRAVENOUS at 20:17

## 2022-11-01 RX ADMIN — CEFAZOLIN 2000 MG: 10 INJECTION, POWDER, FOR SOLUTION INTRAVENOUS at 12:37

## 2022-11-01 RX ADMIN — ROSUVASTATIN CALCIUM 5 MG: 5 TABLET, COATED ORAL at 20:42

## 2022-11-01 RX ADMIN — HYDROMORPHONE HYDROCHLORIDE 0.4 MG: 1 INJECTION, SOLUTION INTRAMUSCULAR; INTRAVENOUS; SUBCUTANEOUS at 16:50

## 2022-11-01 RX ADMIN — MIDAZOLAM HYDROCHLORIDE 1 MG: 1 INJECTION, SOLUTION INTRAMUSCULAR; INTRAVENOUS at 12:54

## 2022-11-01 RX ADMIN — Medication 4.5 MG: at 17:15

## 2022-11-01 RX ADMIN — SODIUM CHLORIDE, POTASSIUM CHLORIDE, SODIUM LACTATE AND CALCIUM CHLORIDE: 600; 310; 30; 20 INJECTION, SOLUTION INTRAVENOUS at 09:49

## 2022-11-01 RX ADMIN — ONDANSETRON 4 MG: 2 INJECTION INTRAMUSCULAR; INTRAVENOUS at 16:34

## 2022-11-01 RX ADMIN — FENTANYL CITRATE 50 MCG: 50 INJECTION, SOLUTION INTRAMUSCULAR; INTRAVENOUS at 12:49

## 2022-11-01 RX ADMIN — DEXAMETHASONE SODIUM PHOSPHATE 4 MG: 4 INJECTION, SOLUTION INTRAMUSCULAR; INTRAVENOUS at 16:34

## 2022-11-01 RX ADMIN — FENTANYL CITRATE 50 MCG: 50 INJECTION INTRAMUSCULAR; INTRAVENOUS at 16:04

## 2022-11-01 RX ADMIN — MIDAZOLAM HYDROCHLORIDE 1 MG: 1 INJECTION, SOLUTION INTRAMUSCULAR; INTRAVENOUS at 12:44

## 2022-11-01 RX ADMIN — LISINOPRIL 2.5 MG: 5 TABLET ORAL at 20:43

## 2022-11-01 RX ADMIN — FENTANYL CITRATE 50 MCG: 50 INJECTION, SOLUTION INTRAMUSCULAR; INTRAVENOUS at 12:54

## 2022-11-01 RX ADMIN — LIDOCAINE HYDROCHLORIDE 60 MG: 20 INJECTION, SOLUTION EPIDURAL; INFILTRATION; INTRACAUDAL; PERINEURAL at 16:07

## 2022-11-01 RX ADMIN — MIDAZOLAM HYDROCHLORIDE 1 MG: 1 INJECTION, SOLUTION INTRAMUSCULAR; INTRAVENOUS at 12:59

## 2022-11-01 RX ADMIN — ROCURONIUM BROMIDE 40 MG: 50 INJECTION, SOLUTION INTRAVENOUS at 16:08

## 2022-11-01 RX ADMIN — GLYCOPYRROLATE 0.8 MG: 0.2 INJECTION, SOLUTION INTRAMUSCULAR; INTRAVENOUS at 17:15

## 2022-11-01 RX ADMIN — ACETAMINOPHEN 1000 MG: 500 TABLET, FILM COATED ORAL at 09:48

## 2022-11-01 RX ADMIN — IOPAMIDOL 5 ML: 612 INJECTION, SOLUTION INTRAVENOUS at 13:05

## 2022-11-01 RX ADMIN — HYDROMORPHONE HYDROCHLORIDE 1 MG: 2 INJECTION, SOLUTION INTRAMUSCULAR; INTRAVENOUS; SUBCUTANEOUS at 15:04

## 2022-11-01 RX ADMIN — HYDROMORPHONE HYDROCHLORIDE 0.5 MG: 2 INJECTION, SOLUTION INTRAMUSCULAR; INTRAVENOUS; SUBCUTANEOUS at 18:14

## 2022-11-01 RX ADMIN — MIDAZOLAM HYDROCHLORIDE 1 MG: 1 INJECTION, SOLUTION INTRAMUSCULAR; INTRAVENOUS at 12:49

## 2022-11-01 RX ADMIN — Medication 2 G: at 16:14

## 2022-11-01 RX ADMIN — FENTANYL CITRATE 50 MCG: 50 INJECTION INTRAMUSCULAR; INTRAVENOUS at 16:46

## 2022-11-01 RX ADMIN — FENTANYL CITRATE 50 MCG: 50 INJECTION, SOLUTION INTRAMUSCULAR; INTRAVENOUS at 12:44

## 2022-11-01 RX ADMIN — ONDANSETRON 4 MG: 2 INJECTION INTRAMUSCULAR; INTRAVENOUS at 18:14

## 2022-11-01 RX ADMIN — PROPOFOL 150 MG: 10 INJECTION, EMULSION INTRAVENOUS at 16:08

## 2022-11-01 RX ADMIN — OXYCODONE 10 MG: 5 TABLET ORAL at 23:53

## 2022-11-01 ASSESSMENT — PAIN SCALES - GENERAL
PAINLEVEL_OUTOF10: 4
PAINLEVEL_OUTOF10: 10
PAINLEVEL_OUTOF10: 3
PAINLEVEL_OUTOF10: 7
PAINLEVEL_OUTOF10: 0
PAINLEVEL_OUTOF10: 8

## 2022-11-01 ASSESSMENT — PAIN DESCRIPTION - LOCATION
LOCATION: ABDOMEN;BACK
LOCATION: BACK;ABDOMEN
LOCATION: FLANK
LOCATION: BACK

## 2022-11-01 ASSESSMENT — PAIN DESCRIPTION - DESCRIPTORS
DESCRIPTORS: ACHING;SHOOTING;DISCOMFORT
DESCRIPTORS: DULL
DESCRIPTORS: SORE;SHOOTING

## 2022-11-01 ASSESSMENT — PAIN - FUNCTIONAL ASSESSMENT
PAIN_FUNCTIONAL_ASSESSMENT: 0-10
PAIN_FUNCTIONAL_ASSESSMENT: 0-10

## 2022-11-01 ASSESSMENT — PAIN DESCRIPTION - PAIN TYPE: TYPE: ACUTE PAIN

## 2022-11-01 ASSESSMENT — COPD QUESTIONNAIRES: CAT_SEVERITY: MILD

## 2022-11-01 NOTE — H&P
Department of Interventional Radiology  [phone]    History and Physical    Patient:  Heied De Jesus MRN:  [id number]  SSN:  [id number]    YOB: 1970  Age:  45 y.o. Sex:  female      Primary Care Provider:  PATRICIA Carter - KEI  Referring Physician:  Terri Solano MD    Subjective:     Chief Complaint: Left staghorn calculus    History of the Present Illness: The patient is a 45 y.o. female who presents for IR guided left nephrostomy catheter placement. Patient had a recent CT scan of the abdomen pelvis which revealed a 2.2 x 0.9 cm left renal pelvis stone with obstruction. She was referred to us by Dr. Jason Roberts. Patient is NPO. She denies taking any blood thinners      Past Medical History:   Diagnosis Date    COPD (chronic obstructive pulmonary disease) ([facility] [address]     Hyperlipidemia     Hypertension     Kidney stone     Spinal stenosis     lumbar     Past Surgical History:   Procedure Laterality Date    COLONOSCOPY          Review of Systems:    Pertinent items are noted in HPI. Prior to Admission medications    Medication Sig Start Date End Date Taking? Authorizing Provider   rosuvastatin (CRESTOR) 5 MG tablet Take 5 mg by mouth at bedtime 10/13/22   Historical Provider, MD   lisinopril (PRINIVIL;ZESTRIL) 10 MG tablet daily 10/5/22   Historical Provider, MD   traMADol (ULTRAM) 50 MG tablet every 6 hours as needed.  10/12/22   Historical Provider, MD   naproxen (NAPROSYN) 250 MG tablet Take 250 mg by mouth 2 times daily (with meals)    Historical Provider, MD   ibuprofen (ADVIL;MOTRIN) 200 MG tablet Take 200 mg by mouth every 6 hours as needed for Pain    Historical Provider, MD   B Complex Vitamins (VITAMIN B COMPLEX PO) Take by mouth daily    Historical Provider, MD   albuterol sulfate HFA (PROVENTIL;VENTOLIN;PROAIR) 108 (90 Base) MCG/ACT inhaler Inhale 2 puffs into the lungs every 6 hours as needed 8/15/22   Historical Provider, MD        No Known Allergies    History reviewed. No pertinent family history. Social History     Tobacco Use    Smoking status: Every Day     Packs/day: 0.50     Types: Cigarettes    Smokeless tobacco: Never   Substance Use Topics    Alcohol use: Yes     Comment: weekly        Not in a hospital admission. Objective:       Physical Examination:    Vitals:    11/01/22 1112   BP: (!) 147/74   Pulse: 69   Resp: 12   Temp: 97.9 °F (36.6 °C)   TempSrc: Oral   SpO2: 97%   Weight: 191 lb (86.6 kg)   Height: 5' 6\" (1.676 m)       Pain Assessment  Pain Level: 6  Pain Location: Flank  Pain Orientation: Left          Pain Level: 6       Pain Location: Flank   Pain Orientation: Left                           HEART: regular rate and rhythm, S1, S2 normal, no murmur, click, rub or gallop  LUNG: clear to auscultation bilaterally  ABDOMEN: soft, non-tender; bowel sounds normal; no masses,  no organomegaly  EXTREMITIES: no pedal edema noted    Laboratory:     Lab Results   Component Value Date/Time     09/20/2022 12:23 PM    K 4.7 09/20/2022 12:23 PM     09/20/2022 12:23 PM    CO2 21 09/20/2022 12:23 PM    BUN 17 09/20/2022 12:23 PM    GFRAA >192 09/20/2022 12:23 PM    GLOB 2.8 09/20/2022 12:23 PM    ALT 10 09/20/2022 12:23 PM     Lab Results   Component Value Date/Time    WBC 6.7 10/25/2022 09:44 AM    WBC 7.4 09/20/2022 12:23 PM    HGB 13.9 10/25/2022 09:44 AM    HGB 15.4 09/20/2022 12:23 PM    HCT 41.7 10/25/2022 09:44 AM    HCT 45.3 09/20/2022 12:23 PM     10/25/2022 09:44 AM     09/20/2022 12:23 PM     Lab Results   Component Value Date/Time    INR 0.9 10/25/2022 09:44 AM       Assessment:     57-year-old female with left staghorn calculus with obstruction        Plan:     Planned Procedure: IR guided left nephrostomy catheter placement    Risks, benefits, and alternatives reviewed with patient and she agrees to proceed with the procedure.       Signed By: Ludy Trujillo     November 1, 2022

## 2022-11-01 NOTE — ANESTHESIA PRE PROCEDURE
Department of Anesthesiology  Preprocedure Note       Name:  Jacinta Benson   Age:  45 y.o.  :  1970                                          MRN:  [id number]         Date:  2022      Surgeon: Edith Raya):  Harmeet Farley MD    Procedure: Procedure(s):  NEPHROLITHOTOMY PERCUTANEOUS/LEFT/TUBE PLACEMENT 11:00 AM    Medications prior to admission:   Prior to Admission medications    Medication Sig Start Date End Date Taking? Authorizing Provider   rosuvastatin (CRESTOR) 5 MG tablet Take 5 mg by mouth at bedtime 10/13/22   Historical Provider, MD   lisinopril (PRINIVIL;ZESTRIL) 10 MG tablet daily 10/5/22   Historical Provider, MD   traMADol (ULTRAM) 50 MG tablet every 6 hours as needed.  10/12/22   Historical Provider, MD   naproxen (NAPROSYN) 250 MG tablet Take 250 mg by mouth 2 times daily (with meals)    Historical Provider, MD   ibuprofen (ADVIL;MOTRIN) 200 MG tablet Take 200 mg by mouth every 6 hours as needed for Pain    Historical Provider, MD   B Complex Vitamins (VITAMIN B COMPLEX PO) Take by mouth daily    Historical Provider, MD   albuterol sulfate HFA (PROVENTIL;VENTOLIN;PROAIR) 108 (90 Base) MCG/ACT inhaler Inhale 2 puffs into the lungs every 6 hours as needed 8/15/22   Historical Provider, MD       Current medications:    Current Facility-Administered Medications   Medication Dose Route Frequency Provider Last Rate Last Admin    lidocaine 1 % injection 1 mL  1 mL IntraDERmal Once PRN Ky Correa MD        lactated ringers infusion   IntraVENous Continuous Ky Correa  mL/hr at 22 0949 New Bag at 22 0949    sodium chloride flush 0.9 % injection 5-40 mL  5-40 mL IntraVENous 2 times per day Ky Correa MD        sodium chloride flush 0.9 % injection 5-40 mL  5-40 mL IntraVENous PRN Ky Correa MD        0.9 % sodium chloride infusion   IntraVENous PRN Ky Correa MD        midazolam PF (VERSED) injection 2 mg  2 mg IntraVENous Once PRN Andrew Bhakta MD [place]        ceFAZolin (ANCEF) 2000 mg in sterile water 20 mL IV syringe  2,000 mg IntraVENous On Call to [address] MD           Allergies:  No Known Allergies    Problem List:    Patient Active Problem List   Diagnosis Code    Kidney stone N20.0       Past Medical History:        Diagnosis Date    COPD (chronic obstructive pulmonary disease) ([facility] [address]     Hyperlipidemia     Hypertension     Kidney stone     Spinal stenosis     lumbar       Past Surgical History:        Procedure Laterality Date    COLONOSCOPY         Social History:    Social History     Tobacco Use    Smoking status: Every Day     Packs/day: 0.50     Types: Cigarettes    Smokeless tobacco: Never   Substance Use Topics    Alcohol use: Yes     Comment: weekly                                Ready to quit: Not Answered  Counseling given: Not Answered      Vital Signs (Current):   Vitals:    11/01/22 0918   BP: (!) 141/75   Pulse: 73   Resp: 12   Temp: 98.1 °F (36.7 °C)   TempSrc: Oral   SpO2: 97%   Weight: 191 lb 3.2 oz (86.7 kg)   Height: 5' 6\" (1.676 m)                                              BP Readings from Last 3 Encounters:   11/01/22 (!) 141/75   10/25/22 134/74   09/20/22 125/82       NPO Status: Time of last liquid consumption: 2330                        Time of last solid consumption: 2330                        Date of last liquid consumption: 10/31/22                        Date of last solid food consumption: 10/31/22    BMI:   Wt Readings from Last 3 Encounters:   11/01/22 191 lb 3.2 oz (86.7 kg)   10/25/22 190 lb 14.4 oz (86.6 kg)   09/20/22 186 lb (84.4 kg)     Body mass index is 30.86 kg/m².     CBC:   Lab Results   Component Value Date/Time    WBC 6.7 10/25/2022 09:44 AM    RBC 4.43 10/25/2022 09:44 AM    HGB 13.9 10/25/2022 09:44 AM    HCT 41.7 10/25/2022 09:44 AM    MCV 94.1 10/25/2022 09:44 AM    RDW 13.2 10/25/2022 09:44 AM     10/25/2022 09:44 AM       CMP:   Lab Results   Component Value Date/Time     09/20/2022 12:23 PM    K 4.7 09/20/2022 12:23 PM     09/20/2022 12:23 PM    CO2 21 09/20/2022 12:23 PM    BUN 17 09/20/2022 12:23 PM    CREATININE 0.44 09/20/2022 12:23 PM    GFRAA >192 09/20/2022 12:23 PM    LABGLOM >60 09/20/2022 12:23 PM    GLUCOSE 95 09/20/2022 12:23 PM    PROT 7.4 09/20/2022 12:23 PM    CALCIUM 10.0 09/20/2022 12:23 PM    BILITOT 0.3 09/20/2022 12:23 PM    ALKPHOS 82 09/20/2022 12:23 PM    AST 13 09/20/2022 12:23 PM    ALT 10 09/20/2022 12:23 PM       POC Tests: No results for input(s): POCGLU, POCNA, POCK, POCCL, POCBUN, POCHEMO, POCHCT in the last 72 hours. Coags:   Lab Results   Component Value Date/Time    PROTIME 12.9 10/25/2022 09:44 AM    INR 0.9 10/25/2022 09:44 AM       HCG (If Applicable):   Lab Results   Component Value Date    PREGTESTUR Negative 11/01/2022        ABGs: No results found for: PHART, PO2ART, NCS5LVF, NSQ7MCW, BEART, V0PEBEFB     Type & Screen (If Applicable):  No results found for: LABABO, LABRH    Drug/Infectious Status (If Applicable):  No results found for: HIV, HEPCAB    COVID-19 Screening (If Applicable): No results found for: COVID19        Anesthesia Evaluation    Airway: Mallampati: II          Dental:      Comment: Chipped back left tooth    Pulmonary:normal exam    (+) COPD: mild,                             Cardiovascular:Negative CV ROS  Exercise tolerance: good (>4 METS),   (+) hypertension:, hyperlipidemia        Rhythm: regular  Rate: normal                    Neuro/Psych:   Negative Neuro/Psych ROS              GI/Hepatic/Renal: Neg GI/Hepatic/Renal ROS            Endo/Other: Negative Endo/Other ROS                    Abdominal:             Vascular: negative vascular ROS. Other Findings:           Anesthesia Plan      general     ASA 3       Induction: intravenous. MIPS: Postoperative opioids intended and Prophylactic antiemetics administered.   Anesthetic plan and risks discussed with patient.                         Ildefonso Gabriel,    11/1/2022

## 2022-11-01 NOTE — PERIOP NOTE
Dr. Destinee Garcia notified of pt Neph tube output very bloody with many blood clots in tubing. Per MD, continue to monitor.

## 2022-11-01 NOTE — PRE SEDATION
Sedation Pre-Procedure Note    Patient Name: Huey Angulo   YOB: 1970  Room/Bed: Room/bed info not found  Medical Record Number: [id number]  Date: 11/1/2022   Time: 12:03 PM       Indication: Left staghorn calculus with need for IR guided nephrostomy catheter placement    Consent: I have discussed with the patient and/or the patient representative the indication, alternatives, and the possible risks and/or complications of the planned procedure and the anesthesia methods. The patient and/or patient representative appear to understand and agree to proceed. Vital Signs:   Vitals:    11/01/22 1112   BP: (!) 147/74   Pulse: 69   Resp: 12   Temp: 97.9 °F (36.6 °C)   SpO2: 97%       Past Medical History:   has a past medical history of COPD (chronic obstructive pulmonary disease) ([facility] [address] Hyperlipidemia, Hypertension, Kidney stone, and Spinal stenosis. Past Surgical History:   has a past surgical history that includes Colonoscopy. Medications:   Scheduled Meds:   Continuous Infusions:   PRN Meds:   Home Meds:   Prior to Admission medications    Medication Sig Start Date End Date Taking? Authorizing Provider   rosuvastatin (CRESTOR) 5 MG tablet Take 5 mg by mouth at bedtime 10/13/22   Historical Provider, MD   lisinopril (PRINIVIL;ZESTRIL) 10 MG tablet daily 10/5/22   Historical Provider, MD   traMADol (ULTRAM) 50 MG tablet every 6 hours as needed.  10/12/22   Historical Provider, MD   naproxen (NAPROSYN) 250 MG tablet Take 250 mg by mouth 2 times daily (with meals)    Historical Provider, MD   ibuprofen (ADVIL;MOTRIN) 200 MG tablet Take 200 mg by mouth every 6 hours as needed for Pain    Historical Provider, MD   B Complex Vitamins (VITAMIN B COMPLEX PO) Take by mouth daily    Historical Provider, MD   albuterol sulfate HFA (PROVENTIL;VENTOLIN;PROAIR) 108 (90 Base) MCG/ACT inhaler Inhale 2 puffs into the lungs every 6 hours as needed 8/15/22   Historical Provider, MD          Pre-Sedation Documentation and Exam:   I have personally completed a history, physical exam & review of systems for this patient (see notes). Vital signs have been reviewed (see flow sheet for vitals).     Mallampati Airway Assessment:  normal, dentition not prohibitive, Mallampati Class III - (soft palate & base of uvula are visible)    Prior History of Anesthesia Complications:   none    ASA Classification:  Class 2 - A normal healthy patient with mild systemic disease    Sedation/ Anesthesia Plan:   intravenous sedation    Medications Planned:   midazolam (Versed) intravenously and fentanyl intravenously    Patient is an appropriate candidate for plan of sedation: yes    Electronically signed by CITLALI Daley on 11/1/2022 at 12:03 PM

## 2022-11-01 NOTE — OP NOTE
Department of Interventional Radiology  [phone]        Interventional Radiology Brief Procedure Note    Patient: Rachell Ortega MRN: [id number]  SSN: [id number]    YOB: 1970  Age: 45 y.o.   Sex: female      Date of Procedure: 11/1/2022    Pre-Procedure Diagnosis: kidney stones    Post-Procedure Diagnosis: SAME    Procedure(s): access for PCNL    Brief Description of Procedure: as above    Performed By: Kyrie Spaulding MD     Assistants: None    Anesthesia:Moderate Sedation    Estimated Blood Loss: Less than 10ml    Specimens:  None    Implants:  Nephro-Ureteral Drain    Findings: left kidney    Complications: None    Recommendations: to OR     Follow Up: prn    Signed By: Kyrie Spaulding MD     November 1, 2022

## 2022-11-01 NOTE — PROGRESS NOTES
TRANSFER - OUT REPORT:    Verbal report given to preOp on Jhon Kessler  being transferred to [facility] for ordered procedure       Report consisted of patient's Situation, Background, Assessment and   Recommendations(SBAR). Information from the following report(s) Nurse Handoff Report, Intake/Output, and MAR was reviewed with the receiving nurse. Lines:   Peripheral IV Left Hand (Active)   Site Assessment Clean, dry & intact 11/01/22 0941   Line Status Infusing 11/01/22 [place] Connections checked and tightened 11/01/22 0941   Phlebitis Assessment No symptoms 11/01/22 0941   Infiltration Assessment 0 11/01/22 0941   Alcohol Cap Used No 11/01/22 0941   Dressing Status Clean, dry & intact 11/01/22 0941   Dressing Type Transparent 11/01/22 0941        Opportunity for questions and clarification was provided.       Patient transported with:  [place]

## 2022-11-01 NOTE — PERIOP NOTE
TRANSFER - OUT REPORT:    Verbal report given to INDIRA Umana on Jhon Sommer  being transferred to  for routine post-op       Report consisted of patients Situation, Background, Assessment and   Recommendations(SBAR). Information from the following report(s) Adult Overview, Surgery Report, Intake/Output, and MAR was reviewed with the receiving nurse. Lines:   Peripheral IV Left Hand (Active)   Site Assessment Clean, dry & intact 11/01/22 1733   Line Status Infusing 11/01/22 [address] Connections checked and tightened 11/01/22 0941   Phlebitis Assessment No symptoms 11/01/22 1733   Infiltration Assessment 0 11/01/22 1733   Alcohol Cap Used No 11/01/22 0941   Dressing Status Clean, dry & intact 11/01/22 1733   Dressing Type Transparent 11/01/22 1733        Opportunity for questions and clarification was provided. Patient transported with:   O2 @ 3 liters    VTE prophylaxis orders have been written for Jhon Sommer. Patient and family given floor number and nurses name.

## 2022-11-01 NOTE — H&P
Jhon Hu  : 1970         Chief Complaint   Patient presents with    Follow-up         HPI      Jhon Hu is a 45 y.o. female referred for UTI and renal stones. Seen one day ago at [facility] for L flank pain. H/O staghorn calculi. CT a/p w contrast revealed 2.2cm x 0.9 cm L renal pelvis stone w obstruction. Images reviewed personally. Urine w ?infection. Culture showed no growth. Prelim results w no growth. sCr 0.44. WBC 7.4     Today she reports cont L flank pain. No fever/chills. Pain is better w Norco. Occ n/v if pain is severe. No LUTS or gross hematuria. No prior h/o stones. She is adopted. No known family history. Stone not visible on KUB. I am not able to retreive the KUB image. Past Medical History   No past medical history on file. Past Surgical History   No past surgical history on file. Current Facility-Administered Medications   No current outpatient medications on file. No current facility-administered medications for this visit. No Known Allergies  Social History               Socioeconomic History    Marital status:        Spouse name: Not on file    Number of children: Not on file    Years of education: Not on file    Highest education level: Not on file   Occupational History    Not on file   Tobacco Use    Smoking status: Not on file    Smokeless tobacco: Not on file   Substance and Sexual Activity    Alcohol use: Not on file    Drug use: Not on file    Sexual activity: Not on file   Other Topics Concern    Not on file   Social History Narrative    Not on file      Social Determinants of Health      Financial Resource Strain: Not on file   Food Insecurity: Not on file   Transportation Needs: Not on file   Physical Activity: Not on file   Stress: Not on file   Social Connections: Not on file   Intimate Partner Violence: Not on file   Housing Stability: Not on file         Family History   No family history on file.         ROS There were no vitals taken for this visit. Physical Exam  General   Mental Status - Patient is alert and oriented X3. Build & Nutrition - Well nourished. Chest and Lung Exam   Chest and lung exam reveals  - normal excursion with symmetric chest walls, quiet, even and easy respiratory effort with no use of accessory muscles and on auscultation, normal breath sounds, no adventitious sounds and normal vocal resonance. Cardiovascular   Cardiovascular examination reveals  - normal heart sounds, regular rate and rhythm with no murmurs. Abdomen   Palpation/Percussion: Palpation and Percussion of the abdomen reveal - Non Tender, No Rebound tenderness, No Rigidity (guarding), No hepatosplenomegaly, No Palpable abdominal masses and Soft. Hernia - Bilateral - No Hernia(s) present. Assessment and Plan    Diagnosis Orders   1. Kidney stone  CBC with Auto Differential     Protime-INR     Urinalysis     Culture, Urine          Left PNL scheduled for 11-1-22. Discussed risks of bleeding, infection, pneumothorax. I told her we could place a stent if she develops pain.

## 2022-11-01 NOTE — PROGRESS NOTES
TRANSFER - IN REPORT:    Verbal report received from Jareth Corbin on Jhon Agudelo  being received from PACU for routine progression of patient care      Report consisted of patient's Situation, Background, Assessment and   Recommendations(SBAR). Information from the following report(s) Nurse Handoff Report, Surgery Report, Intake/Output, MAR, Recent Results, and Med Rec Status was reviewed with the receiving nurse. Opportunity for questions and clarification was provided. Assessment to be completed upon patient's arrival to unit and care assumed.

## 2022-11-01 NOTE — ANESTHESIA PROCEDURE NOTES
Airway  Date/Time: 11/1/2022 4:11 PM  Urgency: elective    Airway not difficult    General Information and Staff    Patient location during procedure: OR  Performed: resident/CRNA     Indications and Patient Condition  Indications for airway management: anesthesia  Spontaneous Ventilation: absent  Sedation level: deep  Preoxygenated: yes  Patient position: sniffing  MILS not maintained throughout  Mask difficulty assessment: vent by bag mask + OA or adjuvant +/- NMBA    Final Airway Details  Final airway type: endotracheal airway      Successful airway: ETT  Cuffed: yes   Successful intubation technique: video laryngoscopy  Facilitating devices/methods: intubating stylet  Endotracheal tube insertion site: oral  Blade: William  Blade size: #3  ETT size (mm): 7.0  Cormack-Lehane Classification: grade I - full view of glottis  Placement verified by: chest auscultation and capnometry   Measured from: lips  Number of attempts at approach: 1  Ventilation between attempts: bag mask  Number of other approaches attempted: 0    Additional Comments  Glidescope used without complications  no

## 2022-11-02 ENCOUNTER — APPOINTMENT (OUTPATIENT)
Dept: CT IMAGING | Age: 52
End: 2022-11-02
Attending: UROLOGY
Payer: COMMERCIAL

## 2022-11-02 PROCEDURE — G0378 HOSPITAL OBSERVATION PER HR: HCPCS

## 2022-11-02 PROCEDURE — 6370000000 HC RX 637 (ALT 250 FOR IP): Performed by: UROLOGY

## 2022-11-02 PROCEDURE — 6360000002 HC RX W HCPCS: Performed by: UROLOGY

## 2022-11-02 PROCEDURE — 74176 CT ABD & PELVIS W/O CONTRAST: CPT

## 2022-11-02 PROCEDURE — 2580000003 HC RX 258: Performed by: UROLOGY

## 2022-11-02 PROCEDURE — 94640 AIRWAY INHALATION TREATMENT: CPT

## 2022-11-02 PROCEDURE — 96374 THER/PROPH/DIAG INJ IV PUSH: CPT

## 2022-11-02 PROCEDURE — 94760 N-INVAS EAR/PLS OXIMETRY 1: CPT

## 2022-11-02 PROCEDURE — 99024 POSTOP FOLLOW-UP VISIT: CPT | Performed by: NURSE PRACTITIONER

## 2022-11-02 RX ADMIN — OXYCODONE 5 MG: 5 TABLET ORAL at 13:28

## 2022-11-02 RX ADMIN — ROSUVASTATIN CALCIUM 5 MG: 5 TABLET, COATED ORAL at 20:31

## 2022-11-02 RX ADMIN — ALBUTEROL SULFATE 2 PUFF: 90 AEROSOL, METERED RESPIRATORY (INHALATION) at 07:52

## 2022-11-02 RX ADMIN — SODIUM CHLORIDE: 9 INJECTION, SOLUTION INTRAVENOUS at 05:04

## 2022-11-02 RX ADMIN — OXYCODONE 5 MG: 5 TABLET ORAL at 07:38

## 2022-11-02 RX ADMIN — HYDROMORPHONE HYDROCHLORIDE 0.5 MG: 1 INJECTION, SOLUTION INTRAMUSCULAR; INTRAVENOUS; SUBCUTANEOUS at 22:37

## 2022-11-02 RX ADMIN — LISINOPRIL 2.5 MG: 5 TABLET ORAL at 07:39

## 2022-11-02 ASSESSMENT — PAIN DESCRIPTION - ORIENTATION
ORIENTATION: LOWER
ORIENTATION: LEFT

## 2022-11-02 ASSESSMENT — PAIN SCALES - GENERAL
PAINLEVEL_OUTOF10: 4
PAINLEVEL_OUTOF10: 6
PAINLEVEL_OUTOF10: 6
PAINLEVEL_OUTOF10: 0
PAINLEVEL_OUTOF10: 0
PAINLEVEL_OUTOF10: 5
PAINLEVEL_OUTOF10: 8

## 2022-11-02 ASSESSMENT — PAIN DESCRIPTION - DESCRIPTORS
DESCRIPTORS: ACHING;BURNING
DESCRIPTORS: DISCOMFORT;ACHING;THROBBING
DESCRIPTORS: ACHING;SHARP

## 2022-11-02 ASSESSMENT — PAIN - FUNCTIONAL ASSESSMENT: PAIN_FUNCTIONAL_ASSESSMENT: ACTIVITIES ARE NOT PREVENTED

## 2022-11-02 ASSESSMENT — PAIN DESCRIPTION - LOCATION
LOCATION: ABDOMEN
LOCATION: FLANK
LOCATION: ABDOMEN;BACK

## 2022-11-02 ASSESSMENT — PAIN SCALES - WONG BAKER: WONGBAKER_NUMERICALRESPONSE: 0

## 2022-11-02 NOTE — PLAN OF CARE
Problem: Pain  Goal: Verbalizes/displays adequate comfort level or baseline comfort level  Outcome: Progressing     Problem: Discharge Planning  Goal: Discharge to home or other facility with appropriate resources  Outcome: Progressing  Flowsheets  Taken 11/1/2022 2014  Discharge to home or other facility with appropriate resources:   Identify barriers to discharge with patient and caregiver   Arrange for needed discharge resources and transportation as appropriate   Identify discharge learning needs (meds, wound care, etc)   Arrange for interpreters to assist at discharge as needed   Refer to discharge planning if patient needs post-hospital services based on physician order or complex needs related to functional status, cognitive ability or social support system  Taken 11/1/2022 2000  Discharge to home or other facility with appropriate resources:   Identify barriers to discharge with patient and caregiver   Refer to discharge planning if patient needs post-hospital services based on physician order or complex needs related to functional status, cognitive ability or social support system     Problem: ABCDS Injury Assessment  Goal: Absence of physical injury  Outcome: Progressing

## 2022-11-02 NOTE — OP NOTE
[facility]  OPERATIVE REPORT    Name:  George Mendes  MR#:  [id number]  :  1970  ACCOUNT #:  [de-identified]  DATE OF SERVICE:  2022    PREOPERATIVE DIAGNOSIS:  Left renal calculus. POSTOPERATIVE DIAGNOSIS:  Left renal calculus. PROCEDURE PERFORMED:  Left percutaneous nephrostolithotomy for stone measuring greater than 2 cm, left antegrade ureteral stent placement, left antegrade pyelogram with interpretation of pyelogram.    SURGEON:  Castro Zhao. Peyton Rincon MD    ASSISTANT:  None. ANESTHESIA:  General.    COMPLICATIONS:  None. SPECIMENS REMOVED:  Stone for analysis. IMPLANTS:  Left ureteral stent. ESTIMATED BLOOD LOSS:  About 100 mL. FINDINGS:  A radiolucent stone in the left renal pelvis measuring 22 mm in maximum diameter. DESCRIPTION OF PROCEDURE:  The patient had been to Interventional Radiology where a percutaneous access had been achieved through the left flank. She was brought to the OR. A general anesthetic was performed. She was given a Iniguez catheter, placed in the prone position, and prepped and draped in sterile fashion. We used C-arm fluoroscopy. There was a single ureteral catheter going from the left flank. The fluoroscopy showed that this goes down into the bladder. I initially passed a movable core 0.038 floppy tip guidewire through the ureteral catheter down to the bladder. The catheter was removed. I attempted then to pass a coaxial catheter over the wire. The 10-Greenlandic catheter tended to bend back on itself and would not go well into the kidney. Therefore, this was removed leaving the wire in place. I passed the original ureteral catheter again. The wire was removed, and it was replaced with an Amplatz Super Stiff guidewire. We then passed the coaxial catheter successfully and passed another Amplatz Super Stiff guidewire, so that there were two in place. One of them was used as a safety wire.   We used the other wire as the working wire. We could not see a stone. It appeared to be radiolucent although the CT scan showed a slightly branched stone in the renal pelvis on the left measuring about 22 cm maximum diameter. A NephroMax balloon-dilating catheter was passed over the wire, and the tip was positioned near the renal pelvis after having reviewed the images from Interventional Radiology. We inflated the balloon to 12 atmospheres. I was then able to pass the sheath over that balloon, and the balloon was deflated and the catheter was removed. The rigid nephroscope was passed through the sheath. It appeared to be entering the renal pelvis. There was some blood clot within the renal pelvis, but I was able to visualize the stone. The CyberWand device was used on a high setting to fragment and suction out the stone. At the end of procedure, the entire stone had been removed. The wire was backloaded into the cystoscope, and a 7-Burkinan 26 cm long double-J stent was passed successfully and left in good position. We left a good coil in the bladder and in the renal pelvis. At this point, a 22-Burkinan [place] catheter was passed through the sheath, and the tip was positioned near the renal pelvis. An antegrade pyelogram was performed. Interpretation of pyelogram:  Contrast was injected through the nephrostomy tube, and the tip was seen in the renal pelvis. There was some blood clot present which was irrigated out with the catheter tip syringe. I inflated the balloon with 1 mL of water. We then removed the safety wire and the sheath. The nephrostomy was sewn to the skin using nylon. We then sent some of the stone for analysis. The patient will be transferred to the floor for observation.       MD CLIFF [email or web address]  D:  11/01/2022 17:34  T:  11/01/2022 21:08  JOB #:  [id number]

## 2022-11-02 NOTE — ANESTHESIA POSTPROCEDURE EVALUATION
Department of Anesthesiology  Postprocedure Note    Patient: Jacinta Benson  MRN: [id number]  YOB: 1970  Date of evaluation: 11/1/2022      Procedure Summary     Date: 11/01/22 Room / Location: [facility] MAIN OR 03 / [facility] MAIN OR    Anesthesia Start: 7513 Anesthesia Stop: 2209    Procedure: NEPHROLITHOTOMY PERCUTANEOUS/LEFT/TUBE PLACEMENT 11:00 AM (Left: Flank) Diagnosis:       Kidney stone      (Kidney stone [N20.0])    Providers: Harmeet Farley MD Responsible Provider: Melania Nunn MD    Anesthesia Type: general ASA Status: 3          Anesthesia Type: No value filed.     Leda Phase I: Leda Score: 9    Leda Phase II:        Anesthesia Post Evaluation    Patient location during evaluation: PACU  Patient participation: complete - patient participated  Level of consciousness: awake and alert  Airway patency: patent  Nausea & Vomiting: no nausea and no vomiting  Complications: no  Cardiovascular status: hemodynamically stable  Respiratory status: acceptable, nonlabored ventilation and spontaneous ventilation  Hydration status: euvolemic  Comments: BP (!) 151/84   Pulse 96   Temp 98.4 °F (36.9 °C) (Oral)   Resp 19   Ht 5' 6\" (1.676 m)   Wt 191 lb 3.2 oz (86.7 kg)   SpO2 97%   BMI 30.86 kg/m²     Multimodal analgesia pain management approach

## 2022-11-02 NOTE — PROGRESS NOTES
Admit Date: 11/1/2022      Subjective: Jhon Fierro is POD 1 NEPHROLITHOTOMY PERCUTANEOUS/LEFT/TUBE PLACEMENT. Guzman/NT in place. Urine clearer in guzman. VSS. Reports pain after having ct scan. Tolerating food, no nausea. Objective:     Patient Vitals for the past 8 hrs:   BP Temp Temp src Pulse Resp SpO2 Weight   11/02/22 1128 (!) 121/59 98.2 °F (36.8 °C) Oral 80 17 96 % --   11/02/22 0753 -- -- -- 80 18 97 % --   11/02/22 0738 -- -- -- -- 16 -- --   11/02/22 0711 (!) 145/83 97.9 °F (36.6 °C) Oral 90 18 96 % --   11/02/22 0434 127/72 98.1 °F (36.7 °C) Oral 86 18 96 % 206 lb 5.6 oz (93.6 kg)     No intake/output data recorded. 10/31 1901 - 11/02 0700  In: 600 [I.V.:600]  Out: 1455 [Urine:1450]    Physical Exam:  GENERAL ASSESSMENT: alert, oriented to person, place and time, no acute distress and no anxiety, depression or agitation  Chest: easy work of breathing  CVS exam: normal rate, regular rhythm, normal S1, S2  ABDOMEN: soft, non tender  Neurological exam reveals alert, oriented, normal speech    Data Review   Recent Results (from the past 24 hour(s))   Hemoglobin and Hematocrit    Collection Time: 11/01/22  6:02 PM   Result Value Ref Range    Hemoglobin 13.6 11.7 - 15.4 g/dL    Hematocrit 41.1 35.8 - 46.3 %       Assessment:     Principal Problem:    Kidney stone  Active Problems:    Left renal stone  Resolved Problems:    * No resolved hospital problems. *        Pre-Op Diagnosis: Kidney stone [N20.0]      Procedures: Procedure(s):  NEPHROLITHOTOMY PERCUTANEOUS/LEFT/TUBE PLACEMENT 11:00 AM    (Pt with radiolucent stones)    Post procedure CT:     1.  Status post left nephrostomy tube placement with ureteral stent. At least 4   distinct calculi are noted at the lower pole of the left kidney, measuring up to   5 mm. There is persistent moderate hydronephrosis.  Subtle hyperattenuation   within the left renal pelvis may represent blood products, likely   postprocedural.   2.  Additional chronic findings, as above. Plan:     Regular diet. Continue NT to drainage. Remove guzman catheter. Continue PRN pain meds. Continue IVF. Ambulate pt. Encourage IS use. Continue treatment today and likely remove NT tomorrow and d/c home. She will follow up on 11/18 for cysto/stent removal     Signed By: Sandie Alvarado, APRN - CNP     November 2, 2022      [facility]  I have reviewed the above note and examined the patient. I agree with the exam, assessment and plan.     Jill Gusman MD

## 2022-11-02 NOTE — PROGRESS NOTES
Pt is stable with bed in lowest position, wheels locked, and call light within reach. Hourly rounds completed. VSS. IV is patent and dressing is clean, dry, and intact. Pain treated per MAR. Report to be given to day shift nurse.

## 2022-11-03 VITALS
OXYGEN SATURATION: 93 % | TEMPERATURE: 98.4 F | BODY MASS INDEX: 33.16 KG/M2 | DIASTOLIC BLOOD PRESSURE: 78 MMHG | HEIGHT: 66 IN | RESPIRATION RATE: 17 BRPM | SYSTOLIC BLOOD PRESSURE: 125 MMHG | HEART RATE: 88 BPM | WEIGHT: 206.35 LBS

## 2022-11-03 PROBLEM — N20.0 LEFT RENAL STONE: Status: RESOLVED | Noted: 2022-11-01 | Resolved: 2022-11-03

## 2022-11-03 PROCEDURE — G0378 HOSPITAL OBSERVATION PER HR: HCPCS

## 2022-11-03 PROCEDURE — 6370000000 HC RX 637 (ALT 250 FOR IP): Performed by: UROLOGY

## 2022-11-03 PROCEDURE — 99024 POSTOP FOLLOW-UP VISIT: CPT | Performed by: NURSE PRACTITIONER

## 2022-11-03 RX ORDER — HYOSCYAMINE SULFATE 0.12 MG/1
0.12 TABLET SUBLINGUAL EVERY 6 HOURS PRN
Qty: 20 EACH | Refills: 0 | Status: SHIPPED | OUTPATIENT
Start: 2022-11-03

## 2022-11-03 RX ORDER — OXYCODONE HYDROCHLORIDE AND ACETAMINOPHEN 5; 325 MG/1; MG/1
1 TABLET ORAL EVERY 6 HOURS PRN
Qty: 20 TABLET | Refills: 0 | Status: SHIPPED | OUTPATIENT
Start: 2022-11-03 | End: 2022-11-08

## 2022-11-03 RX ORDER — CEPHALEXIN 500 MG/1
500 CAPSULE ORAL 4 TIMES DAILY
Qty: 12 CAPSULE | Refills: 0 | Status: SHIPPED | OUTPATIENT
Start: 2022-11-03 | End: 2022-11-09

## 2022-11-03 RX ORDER — DOCUSATE SODIUM 100 MG/1
100 CAPSULE, LIQUID FILLED ORAL 2 TIMES DAILY
Qty: 60 CAPSULE | Refills: 0 | Status: SHIPPED | OUTPATIENT
Start: 2022-11-03 | End: 2022-12-03

## 2022-11-03 RX ADMIN — OXYCODONE 10 MG: 5 TABLET ORAL at 02:15

## 2022-11-03 RX ADMIN — LISINOPRIL 2.5 MG: 5 TABLET ORAL at 09:00

## 2022-11-03 ASSESSMENT — PAIN - FUNCTIONAL ASSESSMENT: PAIN_FUNCTIONAL_ASSESSMENT: ACTIVITIES ARE NOT PREVENTED

## 2022-11-03 ASSESSMENT — PAIN SCALES - GENERAL
PAINLEVEL_OUTOF10: 0
PAINLEVEL_OUTOF10: 7
PAINLEVEL_OUTOF10: 0

## 2022-11-03 ASSESSMENT — PAIN DESCRIPTION - LOCATION: LOCATION: HEAD

## 2022-11-03 ASSESSMENT — PAIN DESCRIPTION - PAIN TYPE: TYPE: ACUTE PAIN

## 2022-11-03 ASSESSMENT — PAIN DESCRIPTION - DESCRIPTORS: DESCRIPTORS: ACHING;DISCOMFORT

## 2022-11-03 ASSESSMENT — PAIN DESCRIPTION - ORIENTATION: ORIENTATION: ANTERIOR

## 2022-11-03 NOTE — DISCHARGE SUMMARY
Discharge Summary    Date:11/3/2022        Patient Name:Jhon Payton     YOB: 1970     Age:52 y.o. Admit Date:11/1/2022   Admission Condition:good   Discharged Condition:good  Discharge Date: 11/03/22     Discharge Diagnoses   Principal Problem:    Kidney stone  Resolved Problems:    Left renal stone      Hospital Stay   Narrative of Hospital Course:     46 yo female with hx of renal stone s/p L PCNL on 11/1/22. Post op CT reviewed by Dr. Juan A Hewitt, some small remaining fragments. Iniguez removed POD 1. Pt voided. NT removed POD 2. Pt tolerated well. Site dressed. She will RTO as scheduled for cysto/stent removal.    Patient Active Problem List   Diagnosis    Kidney stone         Consultants:   None    Time Spent on Discharge:  30 minutes were spent in patient examination, evaluation, counseling as well as medication reconciliation, prescriptions for required medications, discharge plan and follow up. Surgeries/Procedures Performed:  Procedure(s):  NEPHROLITHOTOMY PERCUTANEOUS/LEFT/TUBE PLACEMENT 11:00 AM       Radiology Last 7 Days:  IR GUIDED NEPHROSTOMY CATH PLACEMENT LEFT    Result Date: 96/6/2542  Uncomplicated left percutaneous  nephroureteral placement. Plan: Patient has been transported to the preoperative area in anticipation of nephrolithotomy later today. CT ABDOMEN PELVIS RENAL STONE    Result Date: 11/2/2022  1. Status post left nephrostomy tube placement with ureteral stent. At least 4 distinct calculi are noted at the lower pole of the left kidney, measuring up to 5 mm. There is persistent moderate hydronephrosis. Subtle hyperattenuation within the left renal pelvis may represent blood products, likely postprocedural. 2.  Additional chronic findings, as above. Discharge Plan   Disposition: Home    Provider Follow-Up:   As scheduled      Patient Instructions   Diet: regular diet    Activity: No heavy lifting, pushing, pulling.   Continue to cover nephrostomy site with gauze/ABD pad until drainage stops, at this point place band aid over site until healed. Okay to shower. Ambulate at home and drink plenty of fluids. Discharge Medications         Medication List        START taking these medications      cephALEXin 500 MG capsule  Commonly known as: KEFLEX  Take 1 capsule by mouth 4 times daily     docusate sodium 100 MG capsule  Commonly known as: COLACE  Take 1 capsule by mouth 2 times daily     Hyoscyamine Sulfate SL 0.125 MG Subl  Commonly known as: Levsin/SL  Place 0.125 mg under the tongue every 6 hours as needed (bladder spasms)     oxyCODONE-acetaminophen 5-325 MG per tablet  Commonly known as: Percocet  Take 1 tablet by mouth every 6 hours as needed for Pain for up to 5 days. Intended supply: 3 days.  Take lowest dose possible to manage pain            CONTINUE taking these medications      albuterol sulfate  (90 Base) MCG/ACT inhaler  Commonly known as: PROVENTIL;VENTOLIN;PROAIR     ibuprofen 200 MG tablet  Commonly known as: ADVIL;MOTRIN     lisinopril 10 MG tablet  Commonly known as: PRINIVIL;ZESTRIL     naproxen 250 MG tablet  Commonly known as: NAPROSYN     rosuvastatin 5 MG tablet  Commonly known as: CRESTOR     traMADol 50 MG tablet  Commonly known as: ULTRAM     VITAMIN B COMPLEX PO               Where to Get Your Medications        These medications were sent to [address]  [address] [facility] [place], [place] [address]      Phone: [phone]   cephALEXin 500 MG capsule  docusate sodium 100 MG capsule  Hyoscyamine Sulfate SL 0.125 MG Subl  oxyCODONE-acetaminophen 5-325 MG per tablet         Electronically signed by PATRICIA Malone CNP on 11/3/22 at 10:17 AM EDT

## 2022-11-03 NOTE — PROGRESS NOTES
Pt is resting in bed. Hourly rounds performed this shift. All needs met at this time. Shift report will be given to oncoming nurse.

## 2022-11-03 NOTE — PROGRESS NOTES
Admit Date: 11/1/2022      Subjective: Tobi Vearl Duverney is POD 2 NEPHROLITHOTOMY PERCUTANEOUS/LEFT/TUBE PLACEMENT.  VSS, afebrile. Pain controlled. Urine OP is red via NT. Objective:     Patient Vitals for the past 8 hrs:   BP Temp Temp src Pulse Resp SpO2   11/03/22 0724 125/78 98.4 °F (36.9 °C) Oral 88 17 93 %   11/03/22 0229 (!) 143/70 97.7 °F (36.5 °C) Oral 90 18 92 %     No intake/output data recorded. 11/01 1901 - 11/03 0700  In: -   Out: 3100 [Urine:3100]    Physical Exam:  GENERAL ASSESSMENT: alert, oriented to person, place and time, no acute distress   Chest: easy work of breathing  CVS exam: normal rate, regular rhythm, normal S1, S2  ABDOMEN: soft, non-tender  Neurological exam reveals alert, oriented, normal speech    Data Review No results found for this or any previous visit (from the past 24 hour(s)). Assessment:     Principal Problem:    Kidney stone  Active Problems:    Left renal stone  Resolved Problems:    * No resolved hospital problems. *    Pre-Op Diagnosis: Kidney stone [N20.0]    Procedures: Procedure(s):  NEPHROLITHOTOMY PERCUTANEOUS/LEFT/TUBE PLACEMENT    Plan:     NT removed, pt tolerated well, site dressed. She will d/c home with PO abx, PRN pain meds and PRN spasm meds. She will follow up as scheduled. Signed By: Majo Toledo, PATRICIA - CNP     November 3, 2022      [facility]  I have reviewed the above note and examined the patient. I agree with the exam, assessment and plan.     Mary Anne Lo MD

## 2022-11-03 NOTE — PROGRESS NOTES
Discharge instructions given to patient and explained in detail. Patient to  her RX from pharmacy. All questions answered. IV discontinued.

## 2022-11-08 ENCOUNTER — TELEPHONE (OUTPATIENT)
Dept: UROLOGY | Age: 52
End: 2022-11-08

## 2022-11-08 DIAGNOSIS — N20.0 KIDNEY STONE: Primary | ICD-10-CM

## 2022-11-08 NOTE — TELEPHONE ENCOUNTER
Patient is complaining of burning after urination, and wanting to know if that's normal after having an   NEPHROLITHOTOMY PERCUTANEOUS/LEFT/TUBE PLACEMENT 11:00 AM Left   Done on 11/01/22, she also mentioned that her right above the right knee is numb she is wanting to know if that is apart of the anesthesia side effects. Please advise.

## 2022-11-09 ENCOUNTER — APPOINTMENT (OUTPATIENT)
Dept: CT IMAGING | Age: 52
End: 2022-11-09
Payer: COMMERCIAL

## 2022-11-09 ENCOUNTER — HOSPITAL ENCOUNTER (EMERGENCY)
Age: 52
Discharge: HOME | End: 2022-11-09
Attending: EMERGENCY MEDICINE
Payer: COMMERCIAL

## 2022-11-09 VITALS
HEART RATE: 73 BPM | OXYGEN SATURATION: 98 % | HEIGHT: 66 IN | BODY MASS INDEX: 30.22 KG/M2 | RESPIRATION RATE: 18 BRPM | TEMPERATURE: 98.1 F | WEIGHT: 188 LBS | DIASTOLIC BLOOD PRESSURE: 81 MMHG | SYSTOLIC BLOOD PRESSURE: 117 MMHG

## 2022-11-09 DIAGNOSIS — R31.9 URINARY TRACT INFECTION WITH HEMATURIA, SITE UNSPECIFIED: Primary | ICD-10-CM

## 2022-11-09 DIAGNOSIS — N20.0 KIDNEY STONE: ICD-10-CM

## 2022-11-09 DIAGNOSIS — N39.0 URINARY TRACT INFECTION WITH HEMATURIA, SITE UNSPECIFIED: Primary | ICD-10-CM

## 2022-11-09 LAB
ALBUMIN SERPL-MCNC: 4 G/DL (ref 3.5–5)
ALBUMIN/GLOB SERPL: 1.3 {RATIO} (ref 0.4–1.6)
ALP SERPL-CCNC: 67 U/L (ref 45–117)
ALT SERPL-CCNC: 15 U/L (ref 13–61)
ANION GAP SERPL CALC-SCNC: 11 MMOL/L (ref 2–11)
APPEARANCE UR: ABNORMAL
AST SERPL-CCNC: 10 U/L (ref 15–37)
BACTERIA URNS QL MICRO: ABNORMAL /HPF
BASOPHILS # BLD: 0.1 K/UL (ref 0–0.2)
BASOPHILS NFR BLD: 1 % (ref 0–2)
BILIRUB SERPL-MCNC: 0.3 MG/DL (ref 0.2–1.1)
BILIRUB UR QL: NEGATIVE
BUN SERPL-MCNC: 9 MG/DL (ref 7–18)
CALCIUM SERPL-MCNC: 9.3 MG/DL (ref 8.3–10.4)
CASTS URNS QL MICRO: 0 /LPF
CHLORIDE SERPL-SCNC: 104 MMOL/L (ref 98–107)
CO2 SERPL-SCNC: 25 MMOL/L (ref 21–32)
COLOR UR: YELLOW
CREAT SERPL-MCNC: 0.47 MG/DL (ref 0.6–1)
CRYSTALS URNS QL MICRO: 0 /LPF
DIFFERENTIAL METHOD BLD: ABNORMAL
EOSINOPHIL # BLD: 0.2 K/UL (ref 0–0.8)
EOSINOPHIL NFR BLD: 3 % (ref 0.5–7.8)
EPI CELLS #/AREA URNS HPF: ABNORMAL /HPF
ERYTHROCYTE [DISTWIDTH] IN BLOOD BY AUTOMATED COUNT: 12.7 % (ref 11.9–14.6)
GLOBULIN SER CALC-MCNC: 3.2 G/DL (ref 2.8–4.5)
GLUCOSE SERPL-MCNC: 160 MG/DL (ref 65–100)
GLUCOSE UR STRIP.AUTO-MCNC: NEGATIVE MG/DL
HCG UR QL: NEGATIVE
HCT VFR BLD AUTO: 36.8 % (ref 35.8–46.3)
HGB BLD-MCNC: 12.5 G/DL (ref 11.7–15.4)
HGB UR QL STRIP: ABNORMAL
IMM GRANULOCYTES # BLD AUTO: 0 K/UL (ref 0–0.5)
IMM GRANULOCYTES NFR BLD AUTO: 0 % (ref 0–5)
KETONES UR QL STRIP.AUTO: NEGATIVE MG/DL
LACTATE SERPL-SCNC: 1 MMOL/L (ref 0.4–2)
LEUKOCYTE ESTERASE UR QL STRIP.AUTO: ABNORMAL
LYMPHOCYTES # BLD: 1.4 K/UL (ref 0.5–4.6)
LYMPHOCYTES NFR BLD: 20 % (ref 13–44)
MCH RBC QN AUTO: 32 PG (ref 26.1–32.9)
MCHC RBC AUTO-ENTMCNC: 34 G/DL (ref 31.4–35)
MCV RBC AUTO: 94.1 FL (ref 82–102)
MONOCYTES # BLD: 0.6 K/UL (ref 0.1–1.3)
MONOCYTES NFR BLD: 9 % (ref 4–12)
MUCOUS THREADS URNS QL MICRO: 0 /LPF
NEUTS SEG # BLD: 4.7 K/UL (ref 1.7–8.2)
NEUTS SEG NFR BLD: 68 % (ref 43–78)
NITRITE UR QL STRIP.AUTO: NEGATIVE
NRBC # BLD: 0 K/UL (ref 0–0.2)
OTHER OBSERVATIONS: ABNORMAL
PH UR STRIP: 5.5 [PH] (ref 5–9)
PLATELET # BLD AUTO: 304 K/UL (ref 150–450)
PMV BLD AUTO: 9 FL (ref 9.4–12.3)
POTASSIUM SERPL-SCNC: 4 MMOL/L (ref 3.5–5.1)
PROT SERPL-MCNC: 7.2 G/DL (ref 6.4–8.2)
PROT UR STRIP-MCNC: >300 MG/DL
RBC # BLD AUTO: 3.91 M/UL (ref 4.05–5.2)
RBC #/AREA URNS HPF: ABNORMAL /HPF
SODIUM SERPL-SCNC: 140 MMOL/L (ref 133–143)
SP GR UR REFRACTOMETRY: >=1.03 (ref 1–1.02)
UROBILINOGEN UR QL STRIP.AUTO: 0.2 EU/DL (ref 0.2–1)
WBC # BLD AUTO: 6.9 K/UL (ref 4.3–11.1)
WBC URNS QL MICRO: ABNORMAL /HPF

## 2022-11-09 PROCEDURE — 85025 COMPLETE CBC W/AUTO DIFF WBC: CPT

## 2022-11-09 PROCEDURE — 99284 EMERGENCY DEPT VISIT MOD MDM: CPT

## 2022-11-09 PROCEDURE — 81001 URINALYSIS AUTO W/SCOPE: CPT

## 2022-11-09 PROCEDURE — 83605 ASSAY OF LACTIC ACID: CPT

## 2022-11-09 PROCEDURE — 96361 HYDRATE IV INFUSION ADD-ON: CPT

## 2022-11-09 PROCEDURE — 87086 URINE CULTURE/COLONY COUNT: CPT

## 2022-11-09 PROCEDURE — 2580000003 HC RX 258: Performed by: EMERGENCY MEDICINE

## 2022-11-09 PROCEDURE — 74176 CT ABD & PELVIS W/O CONTRAST: CPT

## 2022-11-09 PROCEDURE — 96375 TX/PRO/DX INJ NEW DRUG ADDON: CPT

## 2022-11-09 PROCEDURE — 81025 URINE PREGNANCY TEST: CPT

## 2022-11-09 PROCEDURE — 80053 COMPREHEN METABOLIC PANEL: CPT

## 2022-11-09 PROCEDURE — 6360000002 HC RX W HCPCS: Performed by: EMERGENCY MEDICINE

## 2022-11-09 PROCEDURE — 96374 THER/PROPH/DIAG INJ IV PUSH: CPT

## 2022-11-09 RX ORDER — SODIUM CHLORIDE, SODIUM LACTATE, POTASSIUM CHLORIDE, AND CALCIUM CHLORIDE .6; .31; .03; .02 G/100ML; G/100ML; G/100ML; G/100ML
1000 INJECTION, SOLUTION INTRAVENOUS ONCE
Status: COMPLETED | OUTPATIENT
Start: 2022-11-09 | End: 2022-11-09

## 2022-11-09 RX ORDER — OXYCODONE HYDROCHLORIDE 5 MG/1
5 TABLET ORAL EVERY 4 HOURS PRN
Qty: 19 TABLET | Refills: 0 | Status: SHIPPED | OUTPATIENT
Start: 2022-11-09 | End: 2022-11-12

## 2022-11-09 RX ORDER — HYDROMORPHONE HYDROCHLORIDE 1 MG/ML
1 INJECTION, SOLUTION INTRAMUSCULAR; INTRAVENOUS; SUBCUTANEOUS
Status: COMPLETED | OUTPATIENT
Start: 2022-11-09 | End: 2022-11-09

## 2022-11-09 RX ORDER — SULFAMETHOXAZOLE AND TRIMETHOPRIM 800; 160 MG/1; MG/1
1 TABLET ORAL 2 TIMES DAILY
Qty: 14 TABLET | Refills: 0 | Status: SHIPPED | OUTPATIENT
Start: 2022-11-09 | End: 2022-11-16

## 2022-11-09 RX ORDER — ONDANSETRON 2 MG/ML
4 INJECTION INTRAMUSCULAR; INTRAVENOUS
Status: COMPLETED | OUTPATIENT
Start: 2022-11-09 | End: 2022-11-09

## 2022-11-09 RX ORDER — OXYCODONE HYDROCHLORIDE AND ACETAMINOPHEN 5; 325 MG/1; MG/1
1 TABLET ORAL EVERY 6 HOURS PRN
COMMUNITY
End: 2022-11-09

## 2022-11-09 RX ORDER — NAPROXEN 500 MG/1
500 TABLET ORAL 2 TIMES DAILY WITH MEALS
Qty: 28 TABLET | Refills: 0 | Status: SHIPPED | OUTPATIENT
Start: 2022-11-09 | End: 2022-11-23

## 2022-11-09 RX ORDER — PHENAZOPYRIDINE HYDROCHLORIDE 200 MG/1
200 TABLET, FILM COATED ORAL 3 TIMES DAILY PRN
Qty: 30 TABLET | Refills: 3 | Status: SHIPPED | OUTPATIENT
Start: 2022-11-09 | End: 2022-11-19

## 2022-11-09 RX ORDER — KETOROLAC TROMETHAMINE 30 MG/ML
30 INJECTION, SOLUTION INTRAMUSCULAR; INTRAVENOUS ONCE
Status: COMPLETED | OUTPATIENT
Start: 2022-11-09 | End: 2022-11-09

## 2022-11-09 RX ADMIN — KETOROLAC TROMETHAMINE 30 MG: 30 INJECTION, SOLUTION INTRAMUSCULAR at 11:37

## 2022-11-09 RX ADMIN — SODIUM CHLORIDE, POTASSIUM CHLORIDE, SODIUM LACTATE AND CALCIUM CHLORIDE 1000 ML: 600; 310; 30; 20 INJECTION, SOLUTION INTRAVENOUS at 11:37

## 2022-11-09 RX ADMIN — ONDANSETRON 4 MG: 2 INJECTION INTRAMUSCULAR; INTRAVENOUS at 11:37

## 2022-11-09 RX ADMIN — HYDROMORPHONE HYDROCHLORIDE 1 MG: 1 INJECTION, SOLUTION INTRAMUSCULAR; INTRAVENOUS; SUBCUTANEOUS at 11:37

## 2022-11-09 ASSESSMENT — PAIN SCALES - GENERAL
PAINLEVEL_OUTOF10: 5
PAINLEVEL_OUTOF10: 3
PAINLEVEL_OUTOF10: 10
PAINLEVEL_OUTOF10: 10

## 2022-11-09 ASSESSMENT — PAIN - FUNCTIONAL ASSESSMENT: PAIN_FUNCTIONAL_ASSESSMENT: 0-10

## 2022-11-09 ASSESSMENT — ENCOUNTER SYMPTOMS
TROUBLE SWALLOWING: 0
SHORTNESS OF BREATH: 0
EYE REDNESS: 0
VOMITING: 0
EYE PAIN: 0
NAUSEA: 1
COUGH: 0
WHEEZING: 0
BACK PAIN: 0
CONSTIPATION: 0
DIARRHEA: 0
SINUS PAIN: 0
EYE ITCHING: 0
ABDOMINAL PAIN: 0

## 2022-11-09 ASSESSMENT — PAIN DESCRIPTION - LOCATION
LOCATION: PELVIS

## 2022-11-09 ASSESSMENT — PAIN DESCRIPTION - PAIN TYPE: TYPE: ACUTE PAIN

## 2022-11-09 ASSESSMENT — PAIN DESCRIPTION - FREQUENCY: FREQUENCY: INTERMITTENT

## 2022-11-09 NOTE — ED NOTES
I have reviewed discharge instructions with the patient. The patient verbalized understanding. Patient left ED via Discharge Method: ambulatory to Home with family    Opportunity for questions and clarification provided. Patient given 3 scripts. To continue your aftercare when you leave the hospital, you may receive an automated call from our care team to check in on how you are doing. This is a free service and part of our promise to provide the best care and service to meet your aftercare needs.  If you have questions, or wish to unsubscribe from this service please call [phone]. Thank you for Choosing our [facility].           Eric Kan RN  11/09/22 2427

## 2022-11-09 NOTE — ED TRIAGE NOTES
Pt states she has surgery to have kidney stone removed last week, states she is now having urinary pain and burning as well as pelvic pain. Pain started a few days ago. Some nausea as well.

## 2022-11-09 NOTE — ED PROVIDER NOTES
Emergency Department Provider Note                   PCP:                PATRICIA Hyatt NP               Age: 45 y.o. Sex: female       ICD-10-CM    1. Urinary tract infection with hematuria, site unspecified  N39.0 oxyCODONE (ROXICODONE) 5 MG immediate release tablet    R31.9       2. Kidney stone  N20.0 oxyCODONE (ROXICODONE) 5 MG immediate release tablet          DISPOSITION Decision To Discharge 11/09/2022 02:26:12 PM       MDM  Number of Diagnoses or Management Options  Kidney stone: new, needed workup  Urinary tract infection with hematuria, site unspecified: new, needed workup  Diagnosis management comments: 77-year-old female patient with left-sided flank pain dysuria and suprapubic discomfort    Status post recent percutaneous nephrolithotomy by Dr. Guanako Vaughn  Denies fever    We will check labs urine and CT urogram  Start fluids, pain and nausea meds    3:16 PM  Work-up today reveals stent in good position  No ureteral calculus  Urine does reveal evidence of infection    Reviewed findings with Ms. Sharifa Velazquez from the urology group  Will place on antibiotics  Outpatient follow-up as scheduled next week       Amount and/or Complexity of Data Reviewed  Clinical lab tests: ordered and reviewed  Tests in the radiology section of CPT®: ordered and reviewed  Tests in the medicine section of CPT®: ordered and reviewed  Decide to obtain previous medical records or to obtain history from someone other than the patient: yes  Obtain history from someone other than the patient: yes  Review and summarize past medical records: yes  Discuss the patient with other providers: yes  Independent visualization of images, tracings, or specimens: yes    Risk of Complications, Morbidity, and/or Mortality  Presenting problems: moderate  Diagnostic procedures: moderate  Management options: moderate  General comments: Elements of this note have been dictated via voice recognition software.   Text and phrases may be limited by the accuracy of the software. The chart has been reviewed, but errors may still be present. Patient Progress  Patient progress: improved             Orders Placed This Encounter   Procedures    Culture, Urine    CT ABDOMEN PELVIS RENAL STONE    CBC with Auto Differential    Urinalysis w rflx microscopic    Comprehensive Metabolic Panel    Pregnancy, Urine    Lactic Acid    Urinalysis, Micro    Diet NPO    Saline lock IV        Medications   lactated ringers bolus (0 mLs IntraVENous Stopped 11/9/22 1253)   ondansetron (ZOFRAN) injection 4 mg (4 mg IntraVENous Given 11/9/22 1137)   ketorolac (TORADOL) injection 30 mg (30 mg IntraVENous Given 11/9/22 1137)   HYDROmorphone HCl PF (DILAUDID) injection 1 mg (1 mg IntraVENous Given 11/9/22 1137)       Discharge Medication List as of 11/9/2022  2:36 PM        START taking these medications    Details   sulfamethoxazole-trimethoprim (BACTRIM DS) 800-160 MG per tablet Take 1 tablet by mouth 2 times daily for 7 days, Disp-14 tablet, R-0Print      oxyCODONE (ROXICODONE) 5 MG immediate release tablet Take 1 tablet by mouth every 4 hours as needed for Pain for up to 3 days. , Disp-19 tablet, R-0Print      phenazopyridine (PYRIDIUM) 200 MG tablet Take 1 tablet by mouth 3 times daily as needed for Pain, Disp-30 tablet, R-3Normal              Jhon Diop is a 45 y.o. female who presents to the Emergency Department with chief complaint of    Chief Complaint   Patient presents with    Post-op Problem    Dysuria      18-year-old female patient presents with complaints of suprapubic abdominal pain  Patient is about a week out from percutaneous nephrolithotomy from her left renal stone    The patient denies overt fever    Reports some mild nausea but no vomiting  Afebrile  Has had her urine checked at her primary care doctor's office after the procedure, but have not had any definitive results    Patient states that she does still have a ureteral stent in place    The history is provided by the patient. Dysuria   This is a recurrent problem. The current episode started more than 2 days ago. The problem occurs every urination. The problem has been gradually worsening. The quality of the pain is described as burning and shooting. The pain is moderate. There has been no fever. Associated symptoms include chills, nausea and flank pain. Pertinent negatives include no vomiting, no frequency and no hematuria. She has tried increased fluids and home medications for the symptoms. Her past medical history is significant for kidney stones and urological procedure. All other systems reviewed and are negative unless otherwise stated in the history of present illness section. Review of Systems   Constitutional:  Positive for chills. Negative for fatigue and fever. HENT:  Negative for ear pain, hearing loss, sinus pain, sneezing and trouble swallowing. Eyes:  Negative for pain, redness and itching. Respiratory:  Negative for cough, shortness of breath and wheezing. Cardiovascular:  Negative for chest pain and palpitations. Gastrointestinal:  Positive for nausea. Negative for abdominal pain, constipation, diarrhea and vomiting. Endocrine: Negative for polydipsia, polyphagia and polyuria. Genitourinary:  Positive for dysuria and flank pain. Negative for frequency and hematuria. Musculoskeletal:  Negative for arthralgias, back pain and myalgias. Skin:  Negative for rash and wound. Allergic/Immunologic: Negative for food allergies and immunocompromised state. Neurological:  Negative for dizziness, syncope, speech difficulty and light-headedness. Hematological:  Negative for adenopathy. Does not bruise/bleed easily. Psychiatric/Behavioral:  Negative for dysphoric mood and self-injury. The patient is not nervous/anxious. All other systems reviewed and are negative.     Past Medical History:   Diagnosis Date    COPD (chronic obstructive pulmonary disease) ([facility] [address] Hyperlipidemia     Hypertension     Kidney stone     Spinal stenosis     lumbar        Past Surgical History:   Procedure Laterality Date    COLONOSCOPY      IR NEPHROSTOMY PERCUTANEOUS LEFT  11/1/2022    IR NEPHROSTOMY PERCUTANEOUS LEFT 11/1/2022 [facility]    KIDNEY STONE REMOVAL Left 11/1/2022    NEPHROLITHOTOMY PERCUTANEOUS/LEFT/TUBE PLACEMENT 11:00 AM performed by Isabela Harding MD at [facility] MAIN OR        History reviewed. No pertinent family history. Social History     Socioeconomic History    Marital status:      Spouse name: None    Number of children: None    Years of education: None    Highest education level: None   Tobacco Use    Smoking status: Every Day     Packs/day: 0.50     Types: Cigarettes    Smokeless tobacco: Never   Vaping Use    Vaping Use: Never used   Substance and Sexual Activity    Alcohol use: Yes     Comment: weekly    Drug use: Yes     Types: Marijuana Dietra Due)     Comment: occasional        Allergies: Patient has no known allergies. Discharge Medication List as of 11/9/2022  2:36 PM        CONTINUE these medications which have NOT CHANGED    Details   docusate sodium (COLACE) 100 MG capsule Take 1 capsule by mouth 2 times daily, Disp-60 capsule, R-0Normal      Hyoscyamine Sulfate SL (LEVSIN/SL) 0.125 MG SUBL Place 0.125 mg under the tongue every 6 hours as needed (bladder spasms), Disp-20 each, R-0Normal      rosuvastatin (CRESTOR) 5 MG tablet Take 5 mg by mouth at bedtimeHistorical Med      lisinopril (PRINIVIL;ZESTRIL) 10 MG tablet dailyHistorical Med      B Complex Vitamins (VITAMIN B COMPLEX PO) Take by mouth dailyHistorical Med      albuterol sulfate HFA (PROVENTIL;VENTOLIN;PROAIR) 108 (90 Base) MCG/ACT inhaler Inhale 2 puffs into the lungs every 6 hours as neededHistorical Med              Vitals signs and nursing note reviewed.    Patient Vitals for the past 4 hrs:   Pulse Resp BP SpO2   11/09/22 1435 73 18 117/81 98 %   11/09/22 1300 -- -- -- 93 % 11/09/22 1255 -- -- -- 94 %   11/09/22 1254 94 (!) 68 112/66 --   11/09/22 1200 -- -- -- 94 %   11/09/22 1145 -- -- 110/74 96 %   11/09/22 1142 -- -- 118/75 96 %          Physical Exam  Vitals and nursing note reviewed. Constitutional:       General: She is in acute distress. Appearance: Normal appearance. She is normal weight. HENT:      Head: Normocephalic and atraumatic. Right Ear: External ear normal.      Left Ear: External ear normal.      Nose: Nose normal.      Mouth/Throat:      Mouth: Mucous membranes are moist.      Pharynx: Oropharynx is clear. Eyes:      General: No scleral icterus. Extraocular Movements: Extraocular movements intact. Conjunctiva/sclera: Conjunctivae normal.      Pupils: Pupils are equal, round, and reactive to light. Cardiovascular:      Rate and Rhythm: Normal rate and regular rhythm. Pulses: Normal pulses. Heart sounds: Normal heart sounds. Pulmonary:      Effort: Pulmonary effort is normal. No respiratory distress. Breath sounds: Normal breath sounds. Abdominal:      General: Abdomen is flat. Bowel sounds are normal. There is no distension. Palpations: Abdomen is soft. There is no mass. Tenderness: There is abdominal tenderness in the suprapubic area. There is left CVA tenderness. Musculoskeletal:         General: No deformity or signs of injury. Normal range of motion. Cervical back: Normal range of motion and neck supple. Skin:     General: Skin is warm and dry. Capillary Refill: Capillary refill takes less than 2 seconds. Neurological:      General: No focal deficit present. Mental Status: She is alert and oriented to person, place, and time. Psychiatric:         Mood and Affect: Mood normal.         Behavior: Behavior normal.         Thought Content:  Thought content normal.         Judgment: Judgment normal.        Procedures    ED EKG Interpretation  EKG was interpreted in the absence of a cardiologist.      Results for orders placed or performed during the hospital encounter of 11/09/22   CT ABDOMEN PELVIS RENAL STONE    Narrative    HISTORY: Left-sided surgery for kidney stone one week ago, now with 2 day  history of flank pain    Exam: CT abdomen and pelvis without contrast, stone protocol    Technique: Thin section axial CT images are obtained from the lung bases to the  pubic symphysis. Radiation dose reduction techniques were used for this study. Our CT scanners use one or all of the following: Automated exposure control,  adjustment of the mA and/or kV according to patient size, use of iterative  reconstruction. Comparison: 11/2/2022    FINDINGS: There is bibasilar atelectasis present. CT abdomen:  No contour deforming abnormality involving the liver and spleen,  though these are incompletely evaluated. The gallbladder and pancreas are  normal. The adrenal glands are unremarkable. Limited evaluation the bowel loops  in the upper abdomen is unremarkable. There is no definite upper abdominal  lymphadenopathy. There is a left ureteral stent present. There is periureteral fat stranding  present. The stent positioning is appropriate. There is a nonobstructing  calculus in the lower pole the left kidney. No right-sided renal or ureteral  calculi demonstrated. Air seen nondependently in the bladder. CT pelvis: The appendix is normal. There is no pelvic adenopathy. There is a  small amount of pelvic free fluid. No free air. Bone window evaluation demonstrates no aggressive osseous lesions. Impression    1. Left ureteral stent. Interval removal of left-sided nephrostomy tube. No  change in the caliber or appearance of the left ureter. 2. Nonobstructing left renal calculus.      CBC with Auto Differential   Result Value Ref Range    WBC 6.9 4.3 - 11.1 K/uL    RBC 3.91 (L) 4.05 - 5.20 M/uL    Hemoglobin 12.5 11.7 - 15.4 g/dL    Hematocrit 36.8 35.8 - 46.3 %    MCV 94.1 82.0 - 102.0 FL    MCH 32.0 26.1 - 32.9 PG    MCHC 34.0 31.4 - 35.0 g/dL    RDW 12.7 11.9 - 14.6 %    Platelets 816 893 - 092 K/uL    MPV 9.0 (L) 9.4 - 12.3 FL    nRBC 0.00 0.0 - 0.2 K/uL    Differential Type AUTOMATED      Seg Neutrophils 68 43 - 78 %    Lymphocytes 20 13 - 44 %    Monocytes 9 4.0 - 12.0 %    Eosinophils % 3 0.5 - 7.8 %    Basophils 1 0.0 - 2.0 %    Immature Granulocytes 0 0.0 - 5.0 %    Segs Absolute 4.7 1.7 - 8.2 K/UL    Absolute Lymph # 1.4 0.5 - 4.6 K/UL    Absolute Mono # 0.6 0.1 - 1.3 K/UL    Absolute Eos # 0.2 0.0 - 0.8 K/UL    Basophils Absolute 0.1 0.0 - 0.2 K/UL    Absolute Immature Granulocyte 0.0 0.0 - 0.5 K/UL   Urinalysis w rflx microscopic   Result Value Ref Range    Color, UA YELLOW      Appearance CLOUDY      Specific Gravity, UA >=1.030 1.001 - 1.023    pH, Urine 5.5 5.0 - 9.0      Protein, UA >300 (A) NEG mg/dL    Glucose, UA Negative mg/dL    Ketones, Urine Negative NEG mg/dL    Bilirubin Urine Negative NEG      Blood, Urine LARGE (A) NEG      Urobilinogen, Urine 0.2 0.2 - 1.0 EU/dL    Nitrite, Urine Negative NEG      Leukocyte Esterase, Urine MODERATE (A) NEG     Comprehensive Metabolic Panel   Result Value Ref Range    Sodium 140 133 - 143 mmol/L    Potassium 4.0 3.5 - 5.1 mmol/L    Chloride 104 98 - 107 mmol/L    CO2 25 21 - 32 mmol/L    Anion Gap 11 2 - 11 mmol/L    Glucose 160 (H) 65 - 100 mg/dL    BUN 9 7.0 - 18.0 MG/DL    Creatinine 0.47 (L) 0.6 - 1.0 MG/DL    Est, Glom Filt Rate >60 >60 ml/min/1.73m2    Calcium 9.3 8.3 - 10.4 MG/DL    Total Bilirubin 0.3 0.2 - 1.1 MG/DL    ALT 15 13.0 - 61.0 U/L    AST 10 (L) 15 - 37 U/L    Alk Phosphatase 67 45.0 - 117.0 U/L    Total Protein 7.2 6.4 - 8.2 g/dL    Albumin 4.0 3.5 - 5.0 g/dL    Globulin 3.2 2.8 - 4.5 g/dL    Albumin/Globulin Ratio 1.3 0.4 - 1.6     Pregnancy, Urine   Result Value Ref Range    HCG(Urine) Pregnancy Test Negative     Lactic Acid   Result Value Ref Range    Lactic Acid 1.00 0.4 - 2.0 mmol/L   Urinalysis, Micro   Result Value Ref Range    WBC, UA 20-50 0 /hpf    RBC, UA 20-50 0 /hpf    Epithelial Cells UA 3-5 0 /hpf    BACTERIA, URINE 4+ (H) 0 /hpf    Casts 0 0 /lpf    Crystals 0 0 /LPF    Mucus, UA 0 0 /lpf    Other observations RESULTS VERIFIED MANUALLY          CT ABDOMEN PELVIS RENAL STONE   Final Result         1. Left ureteral stent. Interval removal of left-sided nephrostomy tube. No   change in the caliber or appearance of the left ureter. 2. Nonobstructing left renal calculus. Voice dictation software was used during the making of this note. This software is not perfect and grammatical and other typographical errors may be present. This note has not been completely proofread for errors.      Flynn Coe MD  11/09/22 2985

## 2022-11-09 NOTE — TELEPHONE ENCOUNTER
Diagnosis Orders   1. Kidney stone  phenazopyridine (PYRIDIUM) 200 MG tablet        I eprescribed the med  Dysuria is likely due to the ureteral stent . Don't know why her knee is numb. If she has swelling in the leg , it could be from a blood clot. -Holding home antihypertensives as above given hypotension s/p CABG   -C/w home aspirin 81mg qday + atorvastatin 80mg qday

## 2022-11-09 NOTE — DISCHARGE INSTRUCTIONS
You must finish all the prescribed antibiotics.   THERE SHOULD BE NO LEFT OVER PILLS!!    Call your doctor or the follow up doctor to set up appointment for recheck visit    Do not drink alcohol or drive while taking the prescription pain medications    Push Fluids    Return to ER for any worsening symptoms or new problems which may arise

## 2022-11-11 LAB
BACTERIA SPEC CULT: NORMAL
SERVICE CMNT-IMP: NORMAL

## 2022-11-17 NOTE — TELEPHONE ENCOUNTER
Patient was called and she  all medications that was Rx by Dr Lisa Martínez, she is still having some discomfort with the stent placement. She have an appt trmrw to possible having it removed.

## 2022-11-18 ENCOUNTER — PROCEDURE VISIT (OUTPATIENT)
Dept: UROLOGY | Age: 52
End: 2022-11-18
Payer: COMMERCIAL

## 2022-11-18 DIAGNOSIS — N30.01 ACUTE CYSTITIS WITH HEMATURIA: ICD-10-CM

## 2022-11-18 DIAGNOSIS — N20.0 KIDNEY STONE: Primary | ICD-10-CM

## 2022-11-18 LAB
BILIRUBIN, URINE, POC: NEGATIVE
BLOOD URINE, POC: NORMAL
GLUCOSE URINE, POC: NEGATIVE
KETONES, URINE, POC: NEGATIVE
LEUKOCYTE ESTERASE, URINE, POC: NORMAL
NITRITE, URINE, POC: NEGATIVE
PH, URINE, POC: 5.5 (ref 4.6–8)
PROTEIN,URINE, POC: 300
SPECIFIC GRAVITY, URINE, POC: 1.03 (ref 1–1.03)
URINALYSIS CLARITY, POC: NORMAL
URINALYSIS COLOR, POC: NORMAL
UROBILINOGEN, POC: NORMAL

## 2022-11-18 PROCEDURE — 99024 POSTOP FOLLOW-UP VISIT: CPT | Performed by: UROLOGY

## 2022-11-18 PROCEDURE — 52310 CYSTOSCOPY AND TREATMENT: CPT | Performed by: UROLOGY

## 2022-11-18 PROCEDURE — 81003 URINALYSIS AUTO W/O SCOPE: CPT | Performed by: UROLOGY

## 2022-11-18 NOTE — LETTER
[facility]  [address] [place] [address] Way  [address] [address]  Phone: [phone]  Fax: [phone]    Nadine Ball MD    November 18, 2022     Dufm Ganser, APRN - NP  [address] [place] [id number]    Patient: Belgica Lopez   MR Number: [id number]   YOB: 1970   Date of Visit: 11/18/2022       Dear Dufm Ganser: Thank you for referring Alexandra Gomez to me for evaluation/treatment. Below are the relevant portions of my assessment and plan of care. If you have questions, please do not hesitate to call me. I look forward to following Jhon along with you.     Sincerely,      Nadine Ball MD

## 2022-11-18 NOTE — PROGRESS NOTES
[facility]  [address]   [address]  [place], [address] [facility]  [phone]    Kerry Grimes  : 1970         HPI   45 y.o., female   referred for UTI and renal stones. Seen one day ago at [facility] for L flank pain. H/O staghorn calculi. CT a/p w contrast revealed 2.2cm x 0.9 cm L renal pelvis stone w obstruction. Images reviewed personally. Urine w ?infection. Culture showed no growth. Prelim results w no growth. sCr 0.44. WBC 7.4     Today she reports cont L flank pain. No fever/chills. Pain is better w Norco. Occ n/v if pain is severe. No LUTS or gross hematuria. No prior h/o stones. She is adopted. No known family history. Stone not visible on KUB. I am not able to retreive the KUB image. On 22 had left PNL. FINDINGS:  A radiolucent stone in the left renal pelvis measuring 22 mm in maximum diameter. CT stone protocol on 22:           1. Left ureteral stent. Interval removal of left-sided nephrostomy tube. No   change in the caliber or appearance of the left ureter. 2. Nonobstructing left renal calculus. [facility] analysis:\"       Interpretation                       [employer] Result:     Specimen consists of numerous irregularly shaped calculus fragments,   weighing a total of 78.4 milligrams. Calculus fragments are composed of intermixed masses of monoclinic   crystals of anhydrous uric acid, calcium oxalate dihydrate octahedrons and   random oriented to columnar monoclinic needles of calcium oxalate   monohydrate. Protein matrices are demonstrated. KUB today shows stent on left, tiny stone in left lower kidney.       Past Medical History:   Diagnosis Date    COPD (chronic obstructive pulmonary disease) ([address]     Hyperlipidemia     Hypertension     Kidney stone     Spinal stenosis     lumbar     Past Surgical History:   Procedure Laterality Date    COLONOSCOPY      IR NEPHROSTOMY PERCUTANEOUS LEFT  2022    IR NEPHROSTOMY PERCUTANEOUS LEFT 11/1/2022 [facility] SPECI[facility]    KIDNEY STONE REMOVAL Left 11/1/2022    NEPHROLITHOTOMY PERCUTANEOUS/LEFT/TUBE PLACEMENT 11:00 AM performed by Nori Ochoa MD at [facility] MAIN OR     Current Outpatient Medications   Medication Sig Dispense Refill    phenazopyridine (PYRIDIUM) 200 MG tablet Take 1 tablet by mouth 3 times daily as needed for Pain 30 tablet 3    naproxen (NAPROSYN) 500 MG tablet Take 1 tablet by mouth 2 times daily (with meals) for 14 days 28 tablet 0    docusate sodium (COLACE) 100 MG capsule Take 1 capsule by mouth 2 times daily 60 capsule 0    Hyoscyamine Sulfate SL (LEVSIN/SL) 0.125 MG SUBL Place 0.125 mg under the tongue every 6 hours as needed (bladder spasms) 20 each 0    rosuvastatin (CRESTOR) 5 MG tablet Take 5 mg by mouth at bedtime      lisinopril (PRINIVIL;ZESTRIL) 10 MG tablet daily      B Complex Vitamins (VITAMIN B COMPLEX PO) Take by mouth daily      albuterol sulfate HFA (PROVENTIL;VENTOLIN;PROAIR) 108 (90 Base) MCG/ACT inhaler Inhale 2 puffs into the lungs every 6 hours as needed       No current facility-administered medications for this visit.      No Known Allergies  Social History     Socioeconomic History    Marital status:      Spouse name: Not on file    Number of children: Not on file    Years of education: Not on file    Highest education level: Not on file   Occupational History    Not on file   Tobacco Use    Smoking status: Every Day     Packs/day: 0.50     Types: Cigarettes    Smokeless tobacco: Never   Vaping Use    Vaping Use: Never used   Substance and Sexual Activity    Alcohol use: Yes     Comment: weekly    Drug use: Yes     Types: Marijuana Keshawn Passy)     Comment: occasional    Sexual activity: Not on file   Other Topics Concern    Not on file   Social History Narrative    Not on file     Social Determinants of Health     Financial Resource Strain: Not on file   Food Insecurity: Not on file   Transportation Needs: Not on file   Physical Activity: Not on file   Stress: Not on file   Social Connections: Not on file   Intimate Partner Violence: Not on file   Housing Stability: Not on file     No family history on file. There were no vitals taken for this visit. UA - Dipstick  Results for orders placed or performed in visit on 11/18/22   AMB POC URINALYSIS DIP STICK AUTO W/O MICRO   Result Value Ref Range    Color (UA POC)      Clarity (UA POC)      Glucose, Urine, POC Negative Negative    Bilirubin, Urine, POC Negative Negative    KETONES, Urine, POC Negative Negative    Specific Gravity, Urine, POC 1.030 1.001 - 1.035    Blood (UA POC) Large Negative    pH, Urine, POC 5.5 4.6 - 8.0    Protein, Urine,  Negative    Urobilinogen, POC 0.2 mg/dL     Nitrite, Urine, POC Negative Negative    Leukocyte Esterase, Urine, POC Large Negative       UA - Micro  WBC -20-25  RBC - 0  Bacteria - 0  Epith - 0      Cystoscopy Procedure:     Procedure Room  3  Scope ID:       DISPO  Assistant:          All risks, benefits and alternatives were again reviewed with patient and she is willing to proceed at this time. Her genital area was prepped and draped and a sterile field applied. 2% lidocaine jelly was injected in the the urethra and allowed to dwell for several minutes. A flexible cystoscope was then inserted into the urethral meatus and advanced under direct vision. The urethra appeared normal with no obstructions. The bladder neck was open without scarring, contractures, frons or tumors present. The bladder was systematically surveyed. No bladder trabeculations were seen. No mucosal abnormalities were seen. The ureteral orifices were seen in their normal orthotopic position. Left stent removed with flexible forceps. The cystoscope was then removed under direct vision. The patient tolerated the procedure well. Assessment and Plan    ICD-10-CM    1.  Kidney stone  N20.0 AMB POC XRAY ABDOMEN 1 VIEW     CO CYSTOURETHROGRAPHY REMV CALCULUS, STENT, FOREIGN BODY, SIMPLE     AMB POC URINALYSIS DIP STICK AUTO W/O MICRO     Kidney Stone, Urine/Saturation     Uric Acid     PTH, intact and calcium     Basic Metabolic Panel      2. Acute cystitis with hematuria  N30.01 Culture, Urine     Culture, Urine        Orders Placed This Encounter   Procedures    Culture, Urine     Standing Status:   Future     Number of Occurrences:   1     Standing Expiration Date:   11/18/2023     Order Specific Question:   Specify (ex-cath, midstream, cysto, etc)? Answer:   clean    AMB POC XRAY ABDOMEN 1 VIEW     Order Specific Question:   Reason for Exam:     Answer:   HX OF STONES     Order Specific Question:   Are you Pregnant? Answer:   No    Kidney Stone, Urine/Saturation     Standing Status:   Future     Standing Expiration Date:   11/18/2023    Uric Acid     Standing Status:   Future     Standing Expiration Date:   11/18/2023    PTH, intact and calcium     Standing Status:   Future     Standing Expiration Date:   97/26/4182    Basic Metabolic Panel     Standing Status:   Future     Standing Expiration Date:   11/18/2023    AMB POC URINALYSIS DIP STICK AUTO W/O MICRO    IL CYSTOURETHROGRAPHY REMV CALCULUS, STENT, FOREIGN BODY, SIMPLE   Stent rem steve  Will get metabolic workup. Return in about 4 months (around 3/18/2023) for KUB.

## 2022-11-21 LAB
BACTERIA SPEC CULT: NORMAL
SERVICE CMNT-IMP: NORMAL

## 2022-12-13 ENCOUNTER — HOSPITAL ENCOUNTER (EMERGENCY)
Age: 52
Discharge: HOME | End: 2022-12-13
Attending: EMERGENCY MEDICINE
Payer: COMMERCIAL

## 2022-12-13 ENCOUNTER — APPOINTMENT (OUTPATIENT)
Dept: CT IMAGING | Age: 52
End: 2022-12-13
Payer: COMMERCIAL

## 2022-12-13 VITALS
RESPIRATION RATE: 17 BRPM | BODY MASS INDEX: 30.22 KG/M2 | SYSTOLIC BLOOD PRESSURE: 130 MMHG | TEMPERATURE: 98.1 F | WEIGHT: 188 LBS | HEART RATE: 90 BPM | OXYGEN SATURATION: 98 % | DIASTOLIC BLOOD PRESSURE: 85 MMHG | HEIGHT: 66 IN

## 2022-12-13 DIAGNOSIS — R31.29 OTHER MICROSCOPIC HEMATURIA: ICD-10-CM

## 2022-12-13 DIAGNOSIS — R10.12 LEFT UPPER QUADRANT ABDOMINAL PAIN: Primary | ICD-10-CM

## 2022-12-13 LAB
ALBUMIN SERPL-MCNC: 4.4 G/DL (ref 3.5–5)
ALBUMIN/GLOB SERPL: 1.5 {RATIO} (ref 0.4–1.6)
ALP SERPL-CCNC: 77 U/L (ref 45–117)
ALT SERPL-CCNC: 16 U/L (ref 13–61)
ANION GAP SERPL CALC-SCNC: 14 MMOL/L (ref 2–11)
APPEARANCE UR: CLEAR
AST SERPL-CCNC: 14 U/L (ref 15–37)
BACTERIA URNS QL MICRO: NORMAL /HPF
BASOPHILS # BLD: 0.1 K/UL (ref 0–0.2)
BASOPHILS NFR BLD: 1 % (ref 0–2)
BILIRUB SERPL-MCNC: 0.4 MG/DL (ref 0.2–1.1)
BILIRUB UR QL: NEGATIVE
BUN SERPL-MCNC: 16 MG/DL (ref 7–18)
CALCIUM SERPL-MCNC: 9.6 MG/DL (ref 8.3–10.4)
CASTS URNS QL MICRO: 0 /LPF
CHLORIDE SERPL-SCNC: 103 MMOL/L (ref 98–107)
CO2 SERPL-SCNC: 24 MMOL/L (ref 21–32)
COLOR UR: YELLOW
CREAT SERPL-MCNC: 0.6 MG/DL (ref 0.6–1)
CRYSTALS URNS QL MICRO: 0 /LPF
DIFFERENTIAL METHOD BLD: ABNORMAL
EOSINOPHIL # BLD: 0.3 K/UL (ref 0–0.8)
EOSINOPHIL NFR BLD: 4 % (ref 0.5–7.8)
EPI CELLS #/AREA URNS HPF: NORMAL /HPF
ERYTHROCYTE [DISTWIDTH] IN BLOOD BY AUTOMATED COUNT: 13.5 % (ref 11.9–14.6)
GLOBULIN SER CALC-MCNC: 3 G/DL (ref 2.8–4.5)
GLUCOSE SERPL-MCNC: 110 MG/DL (ref 65–100)
GLUCOSE UR STRIP.AUTO-MCNC: NEGATIVE MG/DL
HCG UR QL: NEGATIVE
HCT VFR BLD AUTO: 38.7 % (ref 35.8–46.3)
HGB BLD-MCNC: 13.1 G/DL (ref 11.7–15.4)
HGB UR QL STRIP: ABNORMAL
IMM GRANULOCYTES # BLD AUTO: 0 K/UL (ref 0–0.5)
IMM GRANULOCYTES NFR BLD AUTO: 0 % (ref 0–5)
KETONES UR QL STRIP.AUTO: NEGATIVE MG/DL
LEUKOCYTE ESTERASE UR QL STRIP.AUTO: NEGATIVE
LIPASE SERPL-CCNC: 27 U/L (ref 13–60)
LYMPHOCYTES # BLD: 1.5 K/UL (ref 0.5–4.6)
LYMPHOCYTES NFR BLD: 20 % (ref 13–44)
MCH RBC QN AUTO: 32.1 PG (ref 26.1–32.9)
MCHC RBC AUTO-ENTMCNC: 33.9 G/DL (ref 31.4–35)
MCV RBC AUTO: 94.9 FL (ref 82–102)
MONOCYTES # BLD: 0.6 K/UL (ref 0.1–1.3)
MONOCYTES NFR BLD: 8 % (ref 4–12)
MUCOUS THREADS URNS QL MICRO: 0 /LPF
NEUTS SEG # BLD: 4.9 K/UL (ref 1.7–8.2)
NEUTS SEG NFR BLD: 66 % (ref 43–78)
NITRITE UR QL STRIP.AUTO: NEGATIVE
NRBC # BLD: 0 K/UL (ref 0–0.2)
OTHER OBSERVATIONS: NORMAL
PH UR STRIP: 5.5 [PH] (ref 5–9)
PLATELET # BLD AUTO: 245 K/UL (ref 150–450)
PMV BLD AUTO: 8.9 FL (ref 9.4–12.3)
POTASSIUM SERPL-SCNC: 4 MMOL/L (ref 3.5–5.1)
PROT SERPL-MCNC: 7.4 G/DL (ref 6.4–8.2)
PROT UR STRIP-MCNC: NEGATIVE MG/DL
RBC # BLD AUTO: 4.08 M/UL (ref 4.05–5.2)
RBC #/AREA URNS HPF: NORMAL /HPF
SODIUM SERPL-SCNC: 141 MMOL/L (ref 133–143)
SP GR UR REFRACTOMETRY: 1.01 (ref 1–1.02)
UROBILINOGEN UR QL STRIP.AUTO: 0.2 EU/DL (ref 0.2–1)
WBC # BLD AUTO: 7.3 K/UL (ref 4.3–11.1)
WBC URNS QL MICRO: NORMAL /HPF

## 2022-12-13 PROCEDURE — 85025 COMPLETE CBC W/AUTO DIFF WBC: CPT

## 2022-12-13 PROCEDURE — 80053 COMPREHEN METABOLIC PANEL: CPT

## 2022-12-13 PROCEDURE — 83690 ASSAY OF LIPASE: CPT

## 2022-12-13 PROCEDURE — 6360000004 HC RX CONTRAST MEDICATION

## 2022-12-13 PROCEDURE — 96374 THER/PROPH/DIAG INJ IV PUSH: CPT

## 2022-12-13 PROCEDURE — 99285 EMERGENCY DEPT VISIT HI MDM: CPT

## 2022-12-13 PROCEDURE — 81001 URINALYSIS AUTO W/SCOPE: CPT

## 2022-12-13 PROCEDURE — 81025 URINE PREGNANCY TEST: CPT

## 2022-12-13 PROCEDURE — 2580000003 HC RX 258

## 2022-12-13 PROCEDURE — 6360000002 HC RX W HCPCS

## 2022-12-13 PROCEDURE — 74177 CT ABD & PELVIS W/CONTRAST: CPT

## 2022-12-13 RX ORDER — 0.9 % SODIUM CHLORIDE 0.9 %
100 INTRAVENOUS SOLUTION INTRAVENOUS ONCE
Status: COMPLETED | OUTPATIENT
Start: 2022-12-13 | End: 2022-12-13

## 2022-12-13 RX ORDER — KETOROLAC TROMETHAMINE 15 MG/ML
15 INJECTION, SOLUTION INTRAMUSCULAR; INTRAVENOUS
Status: COMPLETED | OUTPATIENT
Start: 2022-12-13 | End: 2022-12-13

## 2022-12-13 RX ORDER — SODIUM CHLORIDE 0.9 % (FLUSH) 0.9 %
5-40 SYRINGE (ML) INJECTION 2 TIMES DAILY
Status: DISCONTINUED | OUTPATIENT
Start: 2022-12-13 | End: 2022-12-13 | Stop reason: HOSPADM

## 2022-12-13 RX ORDER — HYDROCODONE BITARTRATE AND ACETAMINOPHEN 5; 325 MG/1; MG/1
1 TABLET ORAL EVERY 8 HOURS PRN
Qty: 5 TABLET | Refills: 0 | Status: SHIPPED | OUTPATIENT
Start: 2022-12-13 | End: 2022-12-15 | Stop reason: SDUPTHER

## 2022-12-13 RX ORDER — 0.9 % SODIUM CHLORIDE 0.9 %
1000 INTRAVENOUS SOLUTION INTRAVENOUS
Status: COMPLETED | OUTPATIENT
Start: 2022-12-13 | End: 2022-12-13

## 2022-12-13 RX ADMIN — KETOROLAC TROMETHAMINE 15 MG: 15 INJECTION, SOLUTION INTRAMUSCULAR; INTRAVENOUS at 17:38

## 2022-12-13 RX ADMIN — IOPAMIDOL 100 ML: 755 INJECTION, SOLUTION INTRAVENOUS at 16:51

## 2022-12-13 RX ADMIN — SODIUM CHLORIDE 1000 ML: 9 INJECTION, SOLUTION INTRAVENOUS at 16:31

## 2022-12-13 RX ADMIN — SODIUM CHLORIDE 100 ML: 9 INJECTION, SOLUTION INTRAVENOUS at 16:51

## 2022-12-13 ASSESSMENT — PAIN SCALES - GENERAL: PAINLEVEL_OUTOF10: 4

## 2022-12-13 ASSESSMENT — ENCOUNTER SYMPTOMS
NAUSEA: 0
SHORTNESS OF BREATH: 0
VOMITING: 0
BACK PAIN: 1
COUGH: 0
DIARRHEA: 0
ABDOMINAL PAIN: 1

## 2022-12-13 NOTE — ED PROVIDER NOTES
Emergency Department Provider Note                   PCP:                PATRICIA Lemos NP               Age: 45 y.o. Sex: female       ICD-10-CM    1. Left upper quadrant abdominal pain  R10.12 [facility], [place]     HYDROcodone-acetaminophen [facility]) 5-325 MG per tablet      2. Other microscopic hematuria  R31.29 [facility], [address] Decision To Discharge 12/13/2022 07:54:20 PM       MDM  Number of Diagnoses or Management Options  Left upper quadrant abdominal pain: new, needed workup  Other microscopic hematuria: new, needed workup  Diagnosis management comments: This patient is a 64-year-old female with past medical history of left nephrolithiasis and left nephrolithotomy who presents today due to 2 weeks of worsening left upper quadrant pain. Differential diagnosis considered include but not limited to nephrolithiasis, pancreatitis, urinary tract infection, pyelonephritis, surgical site infection. On exam, the patient is in acute, 10 out of 10 pain. She is non toxic and her vitals are stable. CT scan shows left minimal hydronephrosis with periureteral trace stranding which could be concerning for infection. There is no stone seen on CT scan. Patient's surgical incision sites from left nephrolithotomy one month ago are well-healing with no signs of erythema, edema, drainage. Patient's blood work does not show a white blood cell count and is otherwise unremarkable. She does not have an elevated lipase level that would be concerning for pancreatitis. Urinalysis shows trace blood but no UTI. I discussed this patient with Dr. Renuka Walters from urology who looked at the patient's lab and CT results. He said that at this time that her pain does not seem to be a result of her previous surgery with the urology group and he suggested finding further underlying causes of the patient's pain.     I discussed this patient with my attending physician Dr. Buel Oppenheim. At this time, there is no evident reason for the patient's left upper quadrant and left flank pain. I will discharge the patient with pain medication to take at home with strict return precautions. I also placed a referral for urgent urology follow-up encouraged the patient to call first thing tomorrow morning in order for her to be seen with her urologist.  I discussed all this with the patient. She verbalized understanding and agreement with the plan. Amount and/or Complexity of Data Reviewed  Clinical lab tests: ordered and reviewed  Tests in the radiology section of CPT®: ordered and reviewed  Discuss the patient with other providers: yes (Dr. Dari Bonilla, urology; Dr. Buel Oppenheim, ER)         ED Course as of 12/13/22 2000   Tue Dec 13, 2022   1939 I spoke with Dr. Dari Bonilla with the urology group  [KS]      ED Course User Index  [KS] CITLALI Evans        Orders Placed This Encounter   Procedures    CT ABDOMEN PELVIS W IV CONTRAST Additional Contrast? None    CBC with Diff    CMP    Lipase    Urinalysis w rflx microscopic    Pregnancy, Urine    Urinalysis, Micro    [facility], [place]    Diet NPO    Saline lock IV        Medications   sodium chloride flush 0.9 % injection 5-40 mL (has no administration in time range)   0.9 % sodium chloride bolus (1,000 mLs IntraVENous New Bag 12/13/22 1631)   0.9 % sodium chloride bolus (100 mLs IntraVENous New Bag 12/13/22 1651)   iopamidol (ISOVUE-370) 76 % injection 100 mL (100 mLs IntraVENous Given 12/13/22 1651)   ketorolac (TORADOL) injection 15 mg (15 mg IntraVENous Given 12/13/22 1738)       New Prescriptions    HYDROCODONE-ACETAMINOPHEN (NORCO) 5-325 MG PER TABLET    Take 1 tablet by mouth every 8 hours as needed for Pain (Start off by only taking 1/2 tab. You may take the other 1/2 if your pain has not been resolved.) for up to 3 days. Intended supply: 3 days.  Take lowest dose possible to manage pain Gopi Allen is a 45 y.o. female who presents to the Emergency Department with chief complaint of    Chief Complaint   Patient presents with    Abdominal Pain      This patient is a 17-year-old female with past medical history of nephrolithiasis, hyperlipidemia, COPD, and hypertension who presents today due to 10 out of 10 left upper quadrant pain starting about 1 week ago. 2 weeks prior to her presentation, the patient had left nephrolithotomy with Dr. Kati Ball from urology. 1 week ago the patient had her stent removed. Patient says that her left upper quadrant pain is colicky and stabbing in nature. It radiates to her back. Patient has no other symptoms at this time. She denies dysuria, urgency, frequency, chest pain, shortness of breath, nausea, vomiting. The history is provided by the patient. No  was used. All other systems reviewed and are negative unless otherwise stated in the history of present illness section. Review of Systems   Constitutional:  Negative for chills and fever. Respiratory:  Negative for cough and shortness of breath. Cardiovascular:  Negative for chest pain and palpitations. Gastrointestinal:  Positive for abdominal pain. Negative for diarrhea, nausea and vomiting. Genitourinary:  Positive for flank pain. Negative for dysuria, frequency and urgency. Musculoskeletal:  Positive for back pain. Negative for myalgias, neck pain and neck stiffness. Neurological:  Negative for dizziness, light-headedness and headaches.      Past Medical History:   Diagnosis Date    COPD (chronic obstructive pulmonary disease) ([address] [address]     Hyperlipidemia     Hypertension     Kidney stone     Spinal stenosis     lumbar        Past Surgical History:   Procedure Laterality Date    COLONOSCOPY      IR NEPHROSTOMY PERCUTANEOUS LEFT  11/1/2022    IR NEPHROSTOMY PERCUTANEOUS LEFT 11/1/2022 [facility]    KIDNEY STONE REMOVAL Left 11/1/2022    NEPHROLITHOTOMY PERCUTANEOUS/LEFT/TUBE PLACEMENT 11:00 AM performed by Otilio Haywood MD at [facility] MAIN OR        No family history on file. Social History     Socioeconomic History    Marital status:    Tobacco Use    Smoking status: Every Day     Packs/day: 0.50     Types: Cigarettes    Smokeless tobacco: Never   Vaping Use    Vaping Use: Never used   Substance and Sexual Activity    Alcohol use: Yes     Comment: weekly    Drug use: Yes     Types: Marijuana Aloma Franklin)     Comment: occasional        Allergies: Patient has no known allergies. Previous Medications    ALBUTEROL SULFATE HFA (PROVENTIL;VENTOLIN;PROAIR) 108 (90 BASE) MCG/ACT INHALER    Inhale 2 puffs into the lungs every 6 hours as needed    B COMPLEX VITAMINS (VITAMIN B COMPLEX PO)    Take by mouth daily    LISINOPRIL (PRINIVIL;ZESTRIL) 10 MG TABLET    daily    ROSUVASTATIN (CRESTOR) 5 MG TABLET    Take 5 mg by mouth at bedtime        Vitals signs and nursing note reviewed. Patient Vitals for the past 4 hrs:   Temp Pulse Resp BP SpO2   12/13/22 1959 98.1 °F (36.7 °C) 90 17 130/85 98 %   12/13/22 1649 -- -- -- 136/86 97 %   12/13/22 1634 -- -- -- 138/85 95 %          Physical Exam  Vitals and nursing note reviewed. Constitutional:       General: She is not in acute distress. Appearance: Normal appearance. She is well-developed. She is not ill-appearing or toxic-appearing. HENT:      Head: Normocephalic and atraumatic. Eyes:      Extraocular Movements: Extraocular movements intact. Pupils: Pupils are equal, round, and reactive to light. Cardiovascular:      Rate and Rhythm: Normal rate and regular rhythm. Heart sounds: Normal heart sounds. Pulmonary:      Effort: Pulmonary effort is normal.      Breath sounds: Normal breath sounds. Abdominal:      General: Abdomen is flat. There is no distension. Palpations: Abdomen is soft. There is no mass. Tenderness: There is abdominal tenderness (LUQ pain).  There is left CVA tenderness. There is no right CVA tenderness, guarding or rebound. Hernia: No hernia is present. Musculoskeletal:         General: No swelling or deformity. Normal range of motion. Cervical back: Normal range of motion. No rigidity. Skin:     General: Skin is warm and dry. Capillary Refill: Capillary refill takes less than 2 seconds. Neurological:      General: No focal deficit present. Mental Status: She is alert and oriented to person, place, and time. Mental status is at baseline. Psychiatric:         Mood and Affect: Mood normal.         Behavior: Behavior normal.         Thought Content: Thought content normal.         Judgment: Judgment normal.        Procedures    Results for orders placed or performed during the hospital encounter of 12/13/22   CT ABDOMEN PELVIS W IV CONTRAST Additional Contrast? None    Narrative    CT of the Abdomen and Pelvis with contrast    CLINICAL INDICATION:  Acute severe pain left flank and left upper abdomen. Follow-up left ureteral stent, left renal stone. COMPARISON: 11/9/2022, 11/2/2022, 9/20/2022    TECHNIQUE: Dose reduction technique used: Automated exposure Control and/or  adjustment of mA and kV according to patient size. Multiple axial images were  obtained through the abdomen and pelvis after intravenous injection of 125cc of  isovue 370. No oral contrast given per request, limiting evaluation of GI tract  and surrounding structures. Coronal reformatted images obtained for further  evaluation of organs. FINDINGS: Partially included lung bases are unremarkable. Abdomen:  No suspicious lesions in the liver or spleen. Gallbladder, adrenal  glands and pancreas appear unremarkable. Left minimal hydroureteronephrosis  with periureteral trace stranding; nonspecific and could be due to prior  instrumentation, with acute infection not excluded if suspected clinically. Removal prior left ureter stent.   There is normal symmetric enhancement of the  kidneys, no renal mass or fluid collection, no radiopaque stones involving  either kidney or ureter. No developing lymphadenopathy. No free air, focal inflammatory changes or fluid  collections in the abdomen. Aorta normal caliber. Patent major vessels. Small  fat containing ventral hernia, mild diastases recti. There is no evidence of bowel obstruction. GI evaluation is limited without oral  contrast. Unremarkable appendix. Pelvis:  No acute inflammatory changes or fluid collections in the pelvis. No  lymphadenopathy or mass. Uterus and ovaries are present. Urinary bladder  demonstrates no intraluminal abnormality by CT. Bones: No acute osseous lesions. Impression    1. Removal of prior left ureter stent. 2.  Minimal left hydroureteronephrosis, but no remaining calculus is visible.     GI details are limited without oral contrast.   CBC with Diff   Result Value Ref Range    WBC 7.3 4.3 - 11.1 K/uL    RBC 4.08 4.05 - 5.20 M/uL    Hemoglobin 13.1 11.7 - 15.4 g/dL    Hematocrit 38.7 35.8 - 46.3 %    MCV 94.9 82.0 - 102.0 FL    MCH 32.1 26.1 - 32.9 PG    MCHC 33.9 31.4 - 35.0 g/dL    RDW 13.5 11.9 - 14.6 %    Platelets 932 093 - 846 K/uL    MPV 8.9 (L) 9.4 - 12.3 FL    nRBC 0.00 0.0 - 0.2 K/uL    Differential Type AUTOMATED      Seg Neutrophils 66 43 - 78 %    Lymphocytes 20 13 - 44 %    Monocytes 8 4.0 - 12.0 %    Eosinophils % 4 0.5 - 7.8 %    Basophils 1 0.0 - 2.0 %    Immature Granulocytes 0 0.0 - 5.0 %    Segs Absolute 4.9 1.7 - 8.2 K/UL    Absolute Lymph # 1.5 0.5 - 4.6 K/UL    Absolute Mono # 0.6 0.1 - 1.3 K/UL    Absolute Eos # 0.3 0.0 - 0.8 K/UL    Basophils Absolute 0.1 0.0 - 0.2 K/UL    Absolute Immature Granulocyte 0.0 0.0 - 0.5 K/UL   CMP   Result Value Ref Range    Sodium 141 133 - 143 mmol/L    Potassium 4.0 3.5 - 5.1 mmol/L    Chloride 103 98 - 107 mmol/L    CO2 24 21 - 32 mmol/L    Anion Gap 14 (H) 2 - 11 mmol/L    Glucose 110 (H) 65 - 100 mg/dL    BUN 16 7.0 - 18.0 MG/DL    Creatinine 0.60 0.6 - 1.0 MG/DL    Est, Glom Filt Rate >60 >60 ml/min/1.73m2    Calcium 9.6 8.3 - 10.4 MG/DL    Total Bilirubin 0.4 0.2 - 1.1 MG/DL    ALT 16 13.0 - 61.0 U/L    AST 14 (L) 15 - 37 U/L    Alk Phosphatase 77 45.0 - 117.0 U/L    Total Protein 7.4 6.4 - 8.2 g/dL    Albumin 4.4 3.5 - 5.0 g/dL    Globulin 3.0 2.8 - 4.5 g/dL    Albumin/Globulin Ratio 1.5 0.4 - 1.6     Lipase   Result Value Ref Range    Lipase 27 13 - 60 U/L   Urinalysis w rflx microscopic   Result Value Ref Range    Color, UA YELLOW      Appearance CLEAR      Specific Gravity, UA 1.015 1.001 - 1.023      pH, Urine 5.5 5.0 - 9.0      Protein, UA Negative NEG mg/dL    Glucose, UA Negative mg/dL    Ketones, Urine Negative NEG mg/dL    Bilirubin Urine Negative NEG      Blood, Urine Trace Intact (A) NEG      Urobilinogen, Urine 0.2 0.2 - 1.0 EU/dL    Nitrite, Urine Negative NEG      Leukocyte Esterase, Urine Negative NEG     Pregnancy, Urine   Result Value Ref Range    HCG(Urine) Pregnancy Test Negative     Urinalysis, Micro   Result Value Ref Range    WBC, UA 5-10 0 /hpf    RBC, UA 0-3 0 /hpf    Epithelial Cells UA 0-3 0 /hpf    BACTERIA, URINE TRACE 0 /hpf    Casts 0 0 /lpf    Crystals 0 0 /LPF    Mucus, UA 0 0 /lpf    Other observations RESULTS VERIFIED MANUALLY          CT ABDOMEN PELVIS W IV CONTRAST Additional Contrast? None   Final Result   1. Removal of prior left ureter stent. 2.  Minimal left hydroureteronephrosis, but no remaining calculus is visible. GI details are limited without oral contrast.                            Voice dictation software was used during the making of this note. This software is not perfect and grammatical and other typographical errors may be present. This note has not been completely proofread for errors.        CITLALI Stevenson  12/13/22 2000

## 2022-12-13 NOTE — Clinical Note
Agustín Quigley was seen and treated in our emergency department on 12/13/2022. She may return to work on 12/16/2022. If you have any questions or concerns, please don't hesitate to call.       CITLALI Mondragon

## 2022-12-14 NOTE — ED PROVIDER NOTES
Note opened in error. Already a documented encounter for this patient on this DOS.       [place], [place]  12/14/22 0203

## 2022-12-14 NOTE — ED NOTES
I have reviewed discharge instructions with the patient. The patient verbalized understanding. Patient left ED via Discharge Method: ambulatory to Home with self    Opportunity for questions and clarification provided. Patient given 1 scripts. To continue your aftercare when you leave the hospital, you may receive an automated call from our care team to check in on how you are doing. This is a free service and part of our promise to provide the best care and service to meet your aftercare needs.  If you have questions, or wish to unsubscribe from this service please call [phone]. Thank you for Choosing our [address] [place] [address] Emergency Department.         Pari Velazquez RN  12/13/22 2000

## 2022-12-15 ENCOUNTER — TELEPHONE (OUTPATIENT)
Dept: UROLOGY | Age: 52
End: 2022-12-15

## 2022-12-15 ENCOUNTER — OFFICE VISIT (OUTPATIENT)
Dept: UROLOGY | Age: 52
End: 2022-12-15
Payer: COMMERCIAL

## 2022-12-15 DIAGNOSIS — R10.12 LEFT UPPER QUADRANT ABDOMINAL PAIN: ICD-10-CM

## 2022-12-15 DIAGNOSIS — R10.9 LEFT FLANK PAIN: ICD-10-CM

## 2022-12-15 DIAGNOSIS — N20.0 KIDNEY STONE: Primary | ICD-10-CM

## 2022-12-15 LAB
BILIRUBIN, URINE, POC: NEGATIVE
BLOOD URINE, POC: NORMAL
GLUCOSE URINE, POC: NEGATIVE
KETONES, URINE, POC: NEGATIVE
LEUKOCYTE ESTERASE, URINE, POC: NEGATIVE
NITRITE, URINE, POC: NEGATIVE
PH, URINE, POC: 5.5 (ref 4.6–8)
PROTEIN,URINE, POC: NEGATIVE
SPECIFIC GRAVITY, URINE, POC: 1.03 (ref 1–1.03)
URINALYSIS CLARITY, POC: NORMAL
URINALYSIS COLOR, POC: NORMAL
UROBILINOGEN, POC: NORMAL

## 2022-12-15 PROCEDURE — 81003 URINALYSIS AUTO W/O SCOPE: CPT | Performed by: NURSE PRACTITIONER

## 2022-12-15 PROCEDURE — 99214 OFFICE O/P EST MOD 30 MIN: CPT | Performed by: NURSE PRACTITIONER

## 2022-12-15 RX ORDER — HYDROCODONE BITARTRATE AND ACETAMINOPHEN 5; 325 MG/1; MG/1
1 TABLET ORAL EVERY 6 HOURS PRN
Qty: 20 TABLET | Refills: 0 | Status: SHIPPED | OUTPATIENT
Start: 2022-12-15 | End: 2022-12-20

## 2022-12-15 RX ORDER — TAMSULOSIN HYDROCHLORIDE 0.4 MG/1
0.4 CAPSULE ORAL NIGHTLY
Qty: 20 CAPSULE | Refills: 0 | Status: SHIPPED | OUTPATIENT
Start: 2022-12-15

## 2022-12-15 ASSESSMENT — ENCOUNTER SYMPTOMS
BACK PAIN: 0
ABDOMINAL PAIN: 0

## 2022-12-15 NOTE — PROGRESS NOTES
[facility]rology  [address] [facility]  Lee Brown [address], [address]  [phone]          Atul Garcia  : 1970    Chief Complaint   Patient presents with    Follow-up     Kidney stone          HPI     Jhon Jackson is a 45 y.o. female initially referred for UTI and renal stones. Seen one day ago at [facility] for L flank pain. H/O staghorn calculi. CT a/p w contrast revealed 2.2cm x 0.9 cm L renal pelvis stone w obstruction. Images reviewed personally. Urine w ?infection. Culture pending. Prelim results w no growth. sCr 0.44. WBC 7.4     No prior h/o stones. She is adopted. No known family history. Current smoker. Underwent L PCNL 22 w Dr Tao Rooney. Stent removed 22. Due to L flank pain, she presented to the ER 22. CT a/p w contrast 22 showed mild hydro but no obvious stone. Images reviewed personally. She cont to have L flank pain. Worse w movement. No fever/chills, gross hematuria, or LUTS. Past Medical History:   Diagnosis Date    COPD (chronic obstructive pulmonary disease) ([address]     Hyperlipidemia     Hypertension     Kidney stone     Spinal stenosis     lumbar     Past Surgical History:   Procedure Laterality Date    COLONOSCOPY      IR NEPHROSTOMY PERCUTANEOUS LEFT  2022    IR NEPHROSTOMY PERCUTANEOUS LEFT 2022 [facility]    KIDNEY STONE REMOVAL Left 2022    NEPHROLITHOTOMY PERCUTANEOUS/LEFT/TUBE PLACEMENT 11:00 AM performed by Homa Llody MD at [facility] MAIN OR     Current Outpatient Medications   Medication Sig Dispense Refill    HYDROcodone-acetaminophen (NORCO) 5-325 MG per tablet Take 1 tablet by mouth every 6 hours as needed for Pain for up to 5 days.  20 tablet 0    tamsulosin (FLOMAX) 0.4 MG capsule Take 1 capsule by mouth at bedtime 20 capsule 0    rosuvastatin (CRESTOR) 5 MG tablet Take 5 mg by mouth at bedtime      lisinopril (PRINIVIL;ZESTRIL) 10 MG tablet daily      B Complex Vitamins (VITAMIN B COMPLEX PO) Take by mouth daily      albuterol sulfate HFA (PROVENTIL;VENTOLIN;PROAIR) 108 (90 Base) MCG/ACT inhaler Inhale 2 puffs into the lungs every 6 hours as needed       No current facility-administered medications for this visit. No Known Allergies  Social History     Socioeconomic History    Marital status:      Spouse name: Not on file    Number of children: Not on file    Years of education: Not on file    Highest education level: Not on file   Occupational History    Not on file   Tobacco Use    Smoking status: Every Day     Packs/day: 0.50     Types: Cigarettes    Smokeless tobacco: Never   Vaping Use    Vaping Use: Never used   Substance and Sexual Activity    Alcohol use: Yes     Comment: weekly    Drug use: Yes     Types: Marijuana Daril Juarez)     Comment: occasional    Sexual activity: Not on file   Other Topics Concern    Not on file   Social History Narrative    Not on file     Social Determinants of Health     Financial Resource Strain: Not on file   Food Insecurity: Not on file   Transportation Needs: Not on file   Physical Activity: Not on file   Stress: Not on file   Social Connections: Not on file   Intimate Partner Violence: Not on file   Housing Stability: Not on file     History reviewed. No pertinent family history. Review of Systems  Constitutional:   Negative for fever. GI:  Negative for abdominal pain. Genitourinary: Positive for history of urolithiasis. Musculoskeletal:  Negative for back pain.     Urinalysis  UA - Dipstick  Results for orders placed or performed in visit on 12/15/22   AMB POC URINALYSIS DIP STICK AUTO W/O MICRO   Result Value Ref Range    Color (UA POC)      Clarity (UA POC)      Glucose, Urine, POC Negative Negative    Bilirubin, Urine, POC Negative Negative    KETONES, Urine, POC Negative Negative    Specific Gravity, Urine, POC 1.030 1.001 - 1.035    Blood (UA POC) Small Negative    pH, Urine, POC 5.5 4.6 - 8.0    Protein, Urine, POC Negative Negative Urobilinogen, POC 0.2 mg/dL     Nitrite, Urine, POC Negative Negative    Leukocyte Esterase, Urine, POC Negative Negative       PHYSICAL EXAM    General appearance - well appearing and in no distress  Mental status - alert, oriented to person, place, and time  Neck - supple, no significant adenopathy  Chest/Lung-  Quiet, even and easy respiratory effort without use of accessory muscles  Skin - normal coloration and turgor, no rashes      Assessment and Plan    ICD-10-CM    1. Kidney stone  N20.0 AMB POC URINALYSIS DIP STICK AUTO W/O MICRO      2. Left flank pain  R10.9       3. Left upper quadrant abdominal pain  R10.12 HYDROcodone-acetaminophen (NORCO) 5-325 MG per tablet          Renal stone/L flank pain/L upper quad abd pain- urine w hematuria but no infection. ? passing stone not visible on CT w contrast. HOLD on add imaging today. Treat as passing stone. Start Flomax 0.4 mg PO daily. Risks, benefits, and alternatives reviewed. Provided Norco 5/325 mg PO q 6 hr PRN pain. RTO in 2-4 weeks for follow up with [facility]. Advised to call sooner if sx worsen. Garrett Arrington, FNP, APRN - CNP  Dr. Cristi Toussaint is supervising physician today and he approves plan of care.

## 2023-01-09 ENCOUNTER — OFFICE VISIT (OUTPATIENT)
Dept: UROLOGY | Age: 53
End: 2023-01-09
Payer: COMMERCIAL

## 2023-01-09 DIAGNOSIS — R10.9 LEFT FLANK PAIN: ICD-10-CM

## 2023-01-09 DIAGNOSIS — N20.0 KIDNEY STONE: Primary | ICD-10-CM

## 2023-01-09 LAB
BILIRUBIN, URINE, POC: NEGATIVE
BLOOD URINE, POC: NORMAL
GLUCOSE URINE, POC: NEGATIVE
KETONES, URINE, POC: NEGATIVE
LEUKOCYTE ESTERASE, URINE, POC: NORMAL
NITRITE, URINE, POC: NEGATIVE
PH, URINE, POC: 5.5 (ref 4.6–8)
PROTEIN,URINE, POC: NEGATIVE
SPECIFIC GRAVITY, URINE, POC: 1.03 (ref 1–1.03)
URINALYSIS CLARITY, POC: NORMAL
URINALYSIS COLOR, POC: NORMAL
UROBILINOGEN, POC: NORMAL

## 2023-01-09 PROCEDURE — 81003 URINALYSIS AUTO W/O SCOPE: CPT | Performed by: NURSE PRACTITIONER

## 2023-01-09 PROCEDURE — 99024 POSTOP FOLLOW-UP VISIT: CPT | Performed by: NURSE PRACTITIONER

## 2023-01-09 RX ORDER — OXYCODONE HYDROCHLORIDE AND ACETAMINOPHEN 5; 325 MG/1; MG/1
1 TABLET ORAL EVERY 6 HOURS PRN
Qty: 20 TABLET | Refills: 0 | Status: SHIPPED | OUTPATIENT
Start: 2023-01-09 | End: 2023-01-14

## 2023-01-09 ASSESSMENT — ENCOUNTER SYMPTOMS
ABDOMINAL PAIN: 0
BACK PAIN: 0

## 2023-01-09 NOTE — PROGRESS NOTES
[facility]  [address] [facility] [address], [address] [facility]  [phone]          Orlando Acevedo  : 1970    Chief Complaint   Patient presents with    Follow-up     Kidney stone           HPI     Jhon Tinajero is a 52 y.o. female initially referred for UTI and renal stones. Seen one day ago at [facility] for L flank pain. H/O staghorn calculi. CT a/p w contrast revealed 2.2cm x 0.9 cm L renal pelvis stone w obstruction. Images reviewed personally. Urine w ?infection. Culture pending. Prelim results w no growth. sCr 0.44. WBC 7.4     No prior h/o stones. She is adopted. No known family history. Current smoker. Underwent L PCNL 22 w Dr Chantel Baires. Stent removed 22. Due to L flank pain, she presented to the ER 22. CT a/p w contrast 22 showed mild hydro but no obvious stone. Images reviewed personally. She cont to have L flank pain. Worse w movement. No fever/chills, gross hematuria, or LUTS. No relief w Norco 5-10 mg. Accompanied by her daughter, daughter's partner, and granddaughter. Past Medical History:   Diagnosis Date    COPD (chronic obstructive pulmonary disease) ([address]     Hyperlipidemia     Hypertension     Kidney stone     Spinal stenosis     lumbar     Past Surgical History:   Procedure Laterality Date    COLONOSCOPY      IR NEPHROSTOMY PERCUTANEOUS LEFT  2022    IR NEPHROSTOMY PERCUTANEOUS LEFT 2022 [facility]    KIDNEY STONE REMOVAL Left 2022    NEPHROLITHOTOMY PERCUTANEOUS/LEFT/TUBE PLACEMENT 11:00 AM performed by Alton Mai MD at [facility] MAIN OR     Current Outpatient Medications   Medication Sig Dispense Refill    oxyCODONE-acetaminophen (PERCOCET) 5-325 MG per tablet Take 1 tablet by mouth every 6 hours as needed for Pain for up to 5 days.  Max Daily Amount: 4 tablets 20 tablet 0    tamsulosin (FLOMAX) 0.4 MG capsule Take 1 capsule by mouth at bedtime 20 capsule 0    rosuvastatin (CRESTOR) 5 MG tablet Take 5 mg by mouth at bedtime      lisinopril (PRINIVIL;ZESTRIL) 10 MG tablet daily      B Complex Vitamins (VITAMIN B COMPLEX PO) Take by mouth daily      albuterol sulfate HFA (PROVENTIL;VENTOLIN;PROAIR) 108 (90 Base) MCG/ACT inhaler Inhale 2 puffs into the lungs every 6 hours as needed       No current facility-administered medications for this visit. No Known Allergies  Social History     Socioeconomic History    Marital status:      Spouse name: Not on file    Number of children: Not on file    Years of education: Not on file    Highest education level: Not on file   Occupational History    Not on file   Tobacco Use    Smoking status: Every Day     Packs/day: 0.50     Types: Cigarettes    Smokeless tobacco: Never   Vaping Use    Vaping Use: Never used   Substance and Sexual Activity    Alcohol use: Yes     Comment: weekly    Drug use: Yes     Types: Marijuana Annalise Vidal)     Comment: occasional    Sexual activity: Not on file   Other Topics Concern    Not on file   Social History Narrative    Not on file     Social Determinants of Health     Financial Resource Strain: Not on file   Food Insecurity: Not on file   Transportation Needs: Not on file   Physical Activity: Not on file   Stress: Not on file   Social Connections: Not on file   Intimate Partner Violence: Not on file   Housing Stability: Not on file     No family history on file. Review of Systems  Constitutional:   Negative for fever. GI:  Negative for abdominal pain. Genitourinary: Positive for history of urolithiasis. Musculoskeletal:  Negative for back pain.     Urinalysis  UA - Dipstick  Results for orders placed or performed in visit on 01/09/23   AMB POC URINALYSIS DIP STICK AUTO W/O MICRO   Result Value Ref Range    Color (UA POC)      Clarity (UA POC)      Glucose, Urine, POC Negative Negative    Bilirubin, Urine, POC Negative Negative    KETONES, Urine, POC Negative Negative    Specific Gravity, Urine, POC 1.030 1.001 - 1.035    Blood (UA POC) Trace Negative    pH, Urine, POC 5.5 4.6 - 8.0    Protein, Urine, POC Negative Negative    Urobilinogen, POC 0.2 mg/dL     Nitrite, Urine, POC Negative Negative    Leukocyte Esterase, Urine, POC Small Negative       UA - Micro  WBC - 0  RBC - 0  Bacteria - 0  Epith - 0    PHYSICAL EXAM    General appearance - well appearing and in no distress  Mental status - alert, oriented to person, place, and time  Neck - supple, no significant adenopathy  Chest/Lung-  Quiet, even and easy respiratory effort without use of accessory muscles  Skin - normal coloration and turgor, no rashes    KUB in office today reviewed by myself and shows NO stone. Assessment and Plan    ICD-10-CM    1. Kidney stone  N20.0 AMB POC XRAY ABDOMEN 1 VIEW     AMB POC URINALYSIS DIP STICK AUTO W/O MICRO     oxyCODONE-acetaminophen (PERCOCET) 5-325 MG per tablet      2. Left flank pain  R10.9 oxyCODONE-acetaminophen (PERCOCET) 5-325 MG per tablet          L flank pain/Renal stone- urine normal. KUB wo stones. ?obstructing stone vs ?scarring. Repeat CT urogram recommended. Will call w results. Provided Percocet 5/325 mg PO q 6 hr PRN pain. Advised to call sooner if needed. Antoinette Gaffney, BELENP, APRN - CNP  Dr. Quang Cooper is supervising physician today and he approves plan of care.

## 2023-01-18 ENCOUNTER — HOSPITAL ENCOUNTER (OUTPATIENT)
Dept: CT IMAGING | Age: 53
Discharge: HOME | End: 2023-01-20
Payer: MEDICAID

## 2023-01-18 DIAGNOSIS — R10.9 LEFT FLANK PAIN: ICD-10-CM

## 2023-01-18 DIAGNOSIS — N20.0 KIDNEY STONE: ICD-10-CM

## 2023-01-18 PROCEDURE — 74176 CT ABD & PELVIS W/O CONTRAST: CPT

## 2023-01-23 RX ORDER — TAMSULOSIN HYDROCHLORIDE 0.4 MG/1
0.4 CAPSULE ORAL NIGHTLY
Qty: 20 CAPSULE | Refills: 0 | Status: SHIPPED | OUTPATIENT
Start: 2023-01-23

## 2023-02-02 RX ORDER — TAMSULOSIN HYDROCHLORIDE 0.4 MG/1
0.4 CAPSULE ORAL NIGHTLY
Qty: 20 CAPSULE | Refills: 0 | OUTPATIENT
Start: 2023-02-02

## 2023-02-08 ENCOUNTER — OFFICE VISIT (OUTPATIENT)
Dept: UROLOGY | Age: 53
End: 2023-02-08

## 2023-02-08 ENCOUNTER — HOSPITAL ENCOUNTER (OUTPATIENT)
Dept: CT IMAGING | Age: 53
Discharge: HOME | End: 2023-02-10
Payer: MEDICAID

## 2023-02-08 DIAGNOSIS — N23 RENAL COLIC ON LEFT SIDE: ICD-10-CM

## 2023-02-08 DIAGNOSIS — R91.1 LUNG NODULE: ICD-10-CM

## 2023-02-08 DIAGNOSIS — N20.0 KIDNEY STONE: Primary | ICD-10-CM

## 2023-02-08 DIAGNOSIS — R05.9 COUGH, UNSPECIFIED TYPE: ICD-10-CM

## 2023-02-08 DIAGNOSIS — N20.1 LEFT URETERAL STONE: ICD-10-CM

## 2023-02-08 LAB
BILIRUBIN, URINE, POC: NEGATIVE
BLOOD URINE, POC: NORMAL
GLUCOSE URINE, POC: NEGATIVE
KETONES, URINE, POC: NEGATIVE
LEUKOCYTE ESTERASE, URINE, POC: NORMAL
NITRITE, URINE, POC: NEGATIVE
PH, URINE, POC: 6 (ref 4.6–8)
PROTEIN,URINE, POC: NEGATIVE
SPECIFIC GRAVITY, URINE, POC: 1.03 (ref 1–1.03)
URINALYSIS CLARITY, POC: NORMAL
URINALYSIS COLOR, POC: NORMAL
UROBILINOGEN, POC: NORMAL

## 2023-02-08 PROCEDURE — 71250 CT THORAX DX C-: CPT

## 2023-02-08 RX ORDER — OXYCODONE HYDROCHLORIDE AND ACETAMINOPHEN 5; 325 MG/1; MG/1
1 TABLET ORAL EVERY 6 HOURS PRN
Qty: 20 TABLET | Refills: 0 | Status: SHIPPED | OUTPATIENT
Start: 2023-02-08 | End: 2023-02-13

## 2023-02-08 NOTE — PROGRESS NOTES
[facility]  [address] [place] [address] [address], [address] [facility]  [phone]          Herber Jane  : 1970    Chief Complaint   Patient presents with    Follow-up          HPI     Jhon Gonzales is a 52 y.o. female initially referred for UTI and renal stones. Seen one day ago at [facility] for L flank pain. H/O staghorn calculi. CT a/p w contrast revealed 2.2cm x 0.9 cm L renal pelvis stone w obstruction. Images reviewed personally. Urine w ?infection. Culture pending. Prelim results w no growth. sCr 0.44. WBC 7.4     No prior h/o stones. She is adopted. No known family history. Current smoker. Underwent L PCNL 22 w Dr Gloria Ramírez. Stent removed 22. Due to L flank pain, she presented to the ER 22. CT a/p w contrast 22 showed mild hydro but no obvious stone. Images reviewed personally. She cont to have L flank pain. KUB wo obvious stone. CT urogram 23 showed punctate L distal ureteral stone. She opted for MET. She has not passed this but her pain is tolerable. No fever/chills. Past Medical History:   Diagnosis Date    COPD (chronic obstructive pulmonary disease) ([address]     Hyperlipidemia     Hypertension     Kidney stone     Spinal stenosis     lumbar     Past Surgical History:   Procedure Laterality Date    COLONOSCOPY      IR NEPHROSTOMY PERCUTANEOUS LEFT  2022    IR NEPHROSTOMY PERCUTANEOUS LEFT 2022 [facility]    KIDNEY STONE REMOVAL Left 2022    NEPHROLITHOTOMY PERCUTANEOUS/LEFT/TUBE PLACEMENT 11:00 AM performed by Pastor Israel MD at [facility] MAIN OR     Current Outpatient Medications   Medication Sig Dispense Refill    oxyCODONE-acetaminophen (PERCOCET) 5-325 MG per tablet Take 1 tablet by mouth every 6 hours as needed for Pain for up to 5 days.  Max Daily Amount: 4 tablets 20 tablet 0    tamsulosin (FLOMAX) 0.4 MG capsule Take 1 capsule by mouth at bedtime 20 capsule 0    rosuvastatin (CRESTOR) 5 MG tablet Take 5 mg by mouth at bedtime      lisinopril (PRINIVIL;ZESTRIL) 10 MG tablet daily      B Complex Vitamins (VITAMIN B COMPLEX PO) Take by mouth daily      albuterol sulfate HFA (PROVENTIL;VENTOLIN;PROAIR) 108 (90 Base) MCG/ACT inhaler Inhale 2 puffs into the lungs every 6 hours as needed       No current facility-administered medications for this visit. No Known Allergies  Social History     Socioeconomic History    Marital status:      Spouse name: Not on file    Number of children: Not on file    Years of education: Not on file    Highest education level: Not on file   Occupational History    Not on file   Tobacco Use    Smoking status: Every Day     Packs/day: 0.50     Types: Cigarettes    Smokeless tobacco: Never   Vaping Use    Vaping Use: Never used   Substance and Sexual Activity    Alcohol use: Yes     Comment: weekly    Drug use: Yes     Types: Marijuana Delpha Reaper)     Comment: occasional    Sexual activity: Not on file   Other Topics Concern    Not on file   Social History Narrative    Not on file     Social Determinants of Health     Financial Resource Strain: Not on file   Food Insecurity: Not on file   Transportation Needs: Not on file   Physical Activity: Not on file   Stress: Not on file   Social Connections: Not on file   Intimate Partner Violence: Not on file   Housing Stability: Not on file     History reviewed. No pertinent family history.     ROS    Urinalysis  UA - Dipstick  Results for orders placed or performed in visit on 02/08/23   AMB POC URINALYSIS DIP STICK AUTO W/O MICRO   Result Value Ref Range    Color (UA POC)      Clarity (UA POC)      Glucose, Urine, POC Negative Negative    Bilirubin, Urine, POC Negative Negative    KETONES, Urine, POC Negative Negative    Specific Gravity, Urine, POC 1.030 1.001 - 1.035    Blood (UA POC) Trace-lysed Negative    pH, Urine, POC 6.0 4.6 - 8.0    Protein, Urine, POC Negative Negative    Urobilinogen, POC 0.2 mg/dL     Nitrite, Urine, POC Negative Negative    Leukocyte Esterase, Urine, POC Trace Negative       UA - Micro  WBC - 0  RBC - 0  Bacteria - 0  Epith - 0    PHYSICAL EXAM    General appearance - well appearing and in no distress  Mental status - alert, oriented to person, place, and time  Neck - supple, no significant adenopathy  Chest/Lung-  Quiet, even and easy respiratory effort without use of accessory muscles  Skin - normal coloration and turgor, no rashes      KUB in office today reviewed by myself and shows L UVJ stone. Assessment and Plan    ICD-10-CM    1. Kidney stone  N20.0 AMB POC URINALYSIS DIP STICK AUTO W/O MICRO     oxyCODONE-acetaminophen (PERCOCET) 5-325 MG per tablet     AMB POC XRAY ABDOMEN 1 VIEW      2. Left ureteral stone  N20.1 AMB POC URINALYSIS DIP STICK AUTO W/O MICRO     oxyCODONE-acetaminophen (PERCOCET) 5-325 MG per tablet      3. Renal colic on left side  B45 AMB POC URINALYSIS DIP STICK AUTO W/O MICRO     oxyCODONE-acetaminophen (PERCOCET) 5-325 MG per tablet          Renal stone- I have educated pt. about diet modifications that can decrease the risk of future calcium stone formation. These include decreasing things high in oxalate such as teas and alisa. Also, I have encouraged pt. to increase clear liquid intake, especially H2O. Advised the recommended water consumption is 3 liter per day. Things high in citrate such as lemon juice should also be increased. L ureteral stone/L flank pain- KUB reviewed. Treatment options reviewed with patient including surgical intervention. Medical expulsion therapy is preferred. Risks of MET that we discussed were pain, nausea, vomiting, bleeding, infection, inability to pass stone fragments requiring additional operative intervention in the form of ESWL, ureteroscopy/laser lithotripsy and/or stent placement, renal failure, recurrent stones. The patient expressed understanding of the above. Cont Flomax 0.4 mg PO daily to aid w MET.  Provided percocet 5/325 mg PO q 6 hr PRN pain. RTO in 2-4 week for OV/KUB or sooner if symptoms worsen or fever of 100.5 or greater occurs. Candis Barbour, BELENP, APRN - CNP  Dr. Rg Swain is supervising physician today and he approves plan of care.

## 2023-03-01 ENCOUNTER — OFFICE VISIT (OUTPATIENT)
Dept: UROLOGY | Age: 53
End: 2023-03-01
Payer: MEDICAID

## 2023-03-01 DIAGNOSIS — N20.0 KIDNEY STONE: Primary | ICD-10-CM

## 2023-03-01 DIAGNOSIS — N20.1 LEFT URETERAL STONE: ICD-10-CM

## 2023-03-01 PROCEDURE — 99213 OFFICE O/P EST LOW 20 MIN: CPT | Performed by: NURSE PRACTITIONER

## 2023-03-01 PROCEDURE — 74018 RADEX ABDOMEN 1 VIEW: CPT | Performed by: NURSE PRACTITIONER

## 2023-03-01 ASSESSMENT — ENCOUNTER SYMPTOMS: NAUSEA: 0

## 2023-03-01 NOTE — PROGRESS NOTES
[facility]  [address] [facility]    [address]  [place], [address] [facility]  [phone]          Stockton Mas  : 1970    Chief Complaint   Patient presents with    Nephrolithiasis          HPI     Jhon Barnes is a 52 y.o. female initially referred for UTI and renal stones. Seen one day ago at [facility] for L flank pain. H/O staghorn calculi. CT a/p w contrast revealed 2.2cm x 0.9 cm L renal pelvis stone w obstruction. Images reviewed personally. Urine w ?infection. Culture pending. Prelim results w no growth. sCr 0.44. WBC 7.4     No prior h/o stones. She is adopted. No known family history. Current smoker. Underwent L PCNL 22 w Dr Ashia Powell. Stent removed 22. Due to L flank pain, she presented to the ER 22. CT a/p w contrast 22 showed mild hydro but no obvious stone. Images reviewed personally. She cont to have L flank pain. KUB wo obvious stone. CT urogram 23 showed punctate L distal ureteral stone. She opted for MET. KUB 23 showed LUVJ stone. She opts to cont MET. Back for follow up. ?passed stone. She is feeling good today. No pain.           Past Medical History:   Diagnosis Date    COPD (chronic obstructive pulmonary disease) ([address]     Hyperlipidemia     Hypertension     Kidney stone     Spinal stenosis     lumbar     Past Surgical History:   Procedure Laterality Date    COLONOSCOPY      IR NEPHROSTOMY PERCUTANEOUS LEFT  2022    IR NEPHROSTOMY PERCUTANEOUS LEFT 2022 [facility]    KIDNEY STONE REMOVAL Left 2022    NEPHROLITHOTOMY PERCUTANEOUS/LEFT/TUBE PLACEMENT 11:00 AM performed by Caden Alford MD at [facility] MAIN OR     Current Outpatient Medications   Medication Sig Dispense Refill    tamsulosin (FLOMAX) 0.4 MG capsule Take 1 capsule by mouth at bedtime 20 capsule 0    rosuvastatin (CRESTOR) 5 MG tablet Take 5 mg by mouth at bedtime      lisinopril (PRINIVIL;ZESTRIL) 10 MG tablet daily      B Complex Vitamins (VITAMIN B COMPLEX PO) Take by mouth daily      albuterol sulfate HFA (PROVENTIL;VENTOLIN;PROAIR) 108 (90 Base) MCG/ACT inhaler Inhale 2 puffs into the lungs every 6 hours as needed       No current facility-administered medications for this visit. No Known Allergies  Social History     Socioeconomic History    Marital status:      Spouse name: Not on file    Number of children: Not on file    Years of education: Not on file    Highest education level: Not on file   Occupational History    Not on file   Tobacco Use    Smoking status: Every Day     Packs/day: 0.50     Types: Cigarettes    Smokeless tobacco: Never   Vaping Use    Vaping Use: Never used   Substance and Sexual Activity    Alcohol use: Yes     Comment: weekly    Drug use: Yes     Types: Marijuana Lujean )     Comment: occasional    Sexual activity: Not on file   Other Topics Concern    Not on file   Social History Narrative    Not on file     Social Determinants of Health     Financial Resource Strain: Not on file   Food Insecurity: Not on file   Transportation Needs: Not on file   Physical Activity: Not on file   Stress: Not on file   Social Connections: Not on file   Intimate Partner Violence: Not on file   Housing Stability: Not on file     History reviewed. No pertinent family history. Review of Systems  Constitutional:   Negative for fever. GI:  Negative for nausea. Genitourinary: Positive for history of urolithiasis. Negative for hematuria and straining. PHYSICAL EXAM    General appearance - well appearing and in no distress  Mental status - alert, oriented to person, place, and time  Neck - supple, no significant adenopathy  Chest/Lung-  Quiet, even and easy respiratory effort without use of accessory muscles  Skin - normal coloration and turgor, no rashes    KUB in office today reviewed by myself and shows NO stone. Assessment and Plan    ICD-10-CM    1.  Kidney stone  N20.0 AMB POC XRAY ABDOMEN 1 VIEW     Basic Metabolic Panel     Kidney Stone, Urine/Saturation     PTH, Intact     Uric Acid     CANCELED: AMB POC PVR, AYAAN,POST-VOID RES,US,NON-IMAGING     CANCELED: AMB POC URINALYSIS DIP STICK AUTO W/O MICRO      2. Left ureteral stone  N20.1           L ureteral stone- resolved. No stone on KUB. No add tx. Renal stone- I have educated pt. about diet modifications that can decrease the risk of future calcium stone formation. These include decreasing things high in oxalate such as teas and alisa. Also, I have encouraged pt. to increase clear liquid intake, especially H2O. Advised the recommended water consumption is 3 liter per day. Things high in citrate such as lemon juice should also be increased. RTO for metabolic work up. RTO in 3 months for follow up with KUB. Advised to call sooner if sx worsen. Pamela Knox is supervising physician today and he approves plan of care.

## 2023-04-18 ENCOUNTER — HOSPITAL ENCOUNTER (OUTPATIENT)
Dept: GENERAL RADIOLOGY | Age: 53
Discharge: HOME | End: 2023-04-20
Payer: MEDICAID

## 2023-04-18 DIAGNOSIS — M47.817 LUMBOSACRAL SPONDYLOSIS WITHOUT MYELOPATHY: ICD-10-CM

## 2023-04-18 PROCEDURE — 72100 X-RAY EXAM L-S SPINE 2/3 VWS: CPT

## 2023-08-09 ENCOUNTER — OFFICE VISIT (OUTPATIENT)
Dept: UROLOGY | Age: 53
End: 2023-08-09
Payer: MEDICAID

## 2023-08-09 DIAGNOSIS — R10.9 LEFT FLANK PAIN: ICD-10-CM

## 2023-08-09 DIAGNOSIS — N20.0 KIDNEY STONE: Primary | ICD-10-CM

## 2023-08-09 LAB
BILIRUBIN, URINE, POC: NEGATIVE
BLOOD URINE, POC: NORMAL
GLUCOSE URINE, POC: NEGATIVE
KETONES, URINE, POC: NEGATIVE
LEUKOCYTE ESTERASE, URINE, POC: NORMAL
NITRITE, URINE, POC: NEGATIVE
PH, URINE, POC: 7.5 (ref 4.6–8)
PROTEIN,URINE, POC: NEGATIVE
SPECIFIC GRAVITY, URINE, POC: 1.02 (ref 1–1.03)
URINALYSIS CLARITY, POC: NORMAL
URINALYSIS COLOR, POC: NORMAL
UROBILINOGEN, POC: NORMAL

## 2023-08-09 PROCEDURE — 74018 RADEX ABDOMEN 1 VIEW: CPT | Performed by: NURSE PRACTITIONER

## 2023-08-09 PROCEDURE — 99214 OFFICE O/P EST MOD 30 MIN: CPT | Performed by: NURSE PRACTITIONER

## 2023-08-09 PROCEDURE — 81003 URINALYSIS AUTO W/O SCOPE: CPT | Performed by: NURSE PRACTITIONER

## 2023-08-09 ASSESSMENT — ENCOUNTER SYMPTOMS
NAUSEA: 0
BACK PAIN: 1

## 2023-08-09 NOTE — PROGRESS NOTES
[facility]  [address], [address]  [facility]  [phone]          Natasha Even  : 1970    Chief Complaint   Patient presents with    Follow-up          HPI     Jhon Ponce is a 52 y.o. female initially referred for UTI and renal stones. Seen one day ago at [facility] for L flank pain. H/O staghorn calculi. CT a/p w contrast revealed 2.2cm x 0.9 cm L renal pelvis stone w obstruction. Images reviewed personally. Urine w ?infection. Culture pending. Prelim results w no growth. sCr 0.44. WBC 7.4     No prior h/o stones. She is adopted. No known family history. Current smoker. Underwent L PCNL 22 w Dr Corinne Nicole. Stent removed 22. Due to L flank pain, she presented to the ER 22. CT a/p w contrast 22 showed mild hydro but no obvious stone. Images reviewed personally. She cont to have L flank pain. KUB wo obvious stone. CT urogram 23 showed punctate L distal ureteral stone. She opted for MET. KUB 23 showed LUVJ stone. She finally passed this. She still have metabolic work up pending. She recently lost her mother and her granddaughter is no longer living w her. This has been very stressful on her. She is back today for l flank pain x 2 weeks. No assoc fever/chills, n/v, gross hematuria, or LUTS.  Pain is minimal.       Past Medical History:   Diagnosis Date    COPD (chronic obstructive pulmonary disease) ([address])     Hyperlipidemia     Hypertension     Kidney stone     Spinal stenosis     lumbar     Past Surgical History:   Procedure Laterality Date    COLONOSCOPY      IR NEPHROSTOMY PERCUTANEOUS LEFT  2022    IR NEPHROSTOMY PERCUTANEOUS LEFT 2022 [facility]    KIDNEY STONE REMOVAL Left 2022    NEPHROLITHOTOMY PERCUTANEOUS/LEFT/TUBE PLACEMENT 11:00 AM performed by Greg Loza MD at [facility] MAIN OR     Current Outpatient Medications   Medication Sig Dispense Refill    tamsulosin (FLOMAX)

## 2023-08-28 ENCOUNTER — NURSE ONLY (OUTPATIENT)
Dept: UROLOGY | Age: 53
End: 2023-08-28

## 2023-08-28 DIAGNOSIS — N20.0 KIDNEY STONE: ICD-10-CM

## 2023-08-28 LAB
CALCIUM SERPL-MCNC: 9.8 MG/DL (ref 8.3–10.4)
PTH-INTACT SERPL-MCNC: 38.6 PG/ML (ref 18.5–88)

## 2023-09-06 ENCOUNTER — OFFICE VISIT (OUTPATIENT)
Dept: UROLOGY | Age: 53
End: 2023-09-06
Payer: MEDICAID

## 2023-09-06 DIAGNOSIS — R10.9 LEFT FLANK PAIN: ICD-10-CM

## 2023-09-06 DIAGNOSIS — N20.0 KIDNEY STONE: Primary | ICD-10-CM

## 2023-09-06 LAB — URATE SERPL-MCNC: 5.5 MG/DL (ref 2.6–6)

## 2023-09-06 PROCEDURE — 99214 OFFICE O/P EST MOD 30 MIN: CPT | Performed by: NURSE PRACTITIONER

## 2023-09-06 PROCEDURE — 74018 RADEX ABDOMEN 1 VIEW: CPT | Performed by: NURSE PRACTITIONER

## 2023-09-06 RX ORDER — HYDROCODONE BITARTRATE AND ACETAMINOPHEN 5; 325 MG/1; MG/1
1 TABLET ORAL EVERY 6 HOURS PRN
Qty: 20 TABLET | Refills: 0 | Status: SHIPPED | OUTPATIENT
Start: 2023-09-06 | End: 2023-09-11

## 2023-09-06 ASSESSMENT — ENCOUNTER SYMPTOMS
BACK PAIN: 1
NAUSEA: 0

## 2023-09-06 NOTE — PROGRESS NOTES
[facility]  [address], [address]  [facility]  [phone]          Lindsay Vogt  : 1970    Chief Complaint   Patient presents with    Follow-up          HPI     Jhon Welch is a 52 y.o. female initially referred for UTI and renal stones. Seen one day ago at [facility] for L flank pain. H/O staghorn calculi. CT a/p w contrast revealed 2.2cm x 0.9 cm L renal pelvis stone w obstruction. Images reviewed personally. Urine w ?infection. Culture pending. Prelim results w no growth. sCr 0.44. WBC 7.4     No prior h/o stones. She is adopted. No known family history. Current smoker. Underwent L PCNL 22 w Dr Moshe Matias. Stent removed 22. Due to L flank pain, she presented to the ER 22. CT a/p w contrast 22 showed mild hydro but no obvious stone. Images reviewed personally. She cont to have L flank pain. KUB wo obvious stone. CT urogram 23 showed punctate L distal ureteral stone. She opted for MET. KUB 23 showed LUVJ stone. She finally passed this. She still have metabolic work up pending. She recently lost her mother and her granddaughter is no longer living w her. This has been very stressful on her. She cont to have L flank pain. Nausea when pain is severe. No other symptoms or fever/chills.          Past Medical History:   Diagnosis Date    COPD (chronic obstructive pulmonary disease) ([address])     Hyperlipidemia     Hypertension     Kidney stone     Spinal stenosis     lumbar     Past Surgical History:   Procedure Laterality Date    COLONOSCOPY      IR NEPHROSTOMY PERCUTANEOUS LEFT  2022    IR NEPHROSTOMY PERCUTANEOUS LEFT 2022 [facility]    KIDNEY STONE REMOVAL Left 2022    NEPHROLITHOTOMY PERCUTANEOUS/LEFT/TUBE PLACEMENT 11:00 AM performed by Mian Lujan MD at [facility] MAIN OR     Current Outpatient Medications   Medication Sig Dispense Refill    tamsulosin (FLOMAX) 0.4 MG capsule Take

## 2023-09-07 LAB
AMMONIA 24H UR-MCNC: ABNORMAL UG/DL
AMMONIA 24H UR-SRATE: 18 MEQ/24 HR
CA H2 PHOS DIHYD CRY URNS QL MICRO: 1.3 RATIO (ref 0–3)
CALCIUM 24H UR-MCNC: 23.8 MG/DL
CALCIUM 24H UR-MRATE: 202.3 MG/24 HR (ref 0–320)
CAOX INDEX 24H UR-RTO: 7.09 RATIO (ref 0–6)
CHLORIDE 24H UR-SCNC: 105 MMOL/L
CHLORIDE 24H UR-SRATE: 89 MMOL/24 HR (ref 38–210)
CITRATE 24H UR-MCNC: 776 MG/L
CITRATE 24H UR-MRATE: 660 MG/24 HR (ref 320–1240)
COLLECT DURATION TIME UR: 24 HR
CREAT 24H UR-MCNC: 128.1 MG/DL
CREAT 24H UR-MRATE: 1088.9 MG/24 HR (ref 800–1800)
CYSTINE 24H UR-MRATE: 18 MG/24 HR (ref 2.1–58)
CYSTINE UR-MCNC: 21.18 MG/L
LABORATORY COMMENT REPORT: ABNORMAL
MAGNESIUM 24H UR-MRATE: 116 MG/24 HR (ref 12–293)
MAGNESIUM UR-MCNC: 13.6 MG/DL
NA URATE 24H SATFR UR: 1.91 RATIO (ref 0–4)
OSMOLALITY UR: 763 MOSMOL/KG (ref 300–900)
OXALATE 24H UR-MRATE: 16 MG/24 HR (ref 4–31)
OXALATE UR-MCNC: 19 MG/L
PH 24H UR: 5.4 (ref 4.5–8)
PHOSPHATE 24H UR-MCNC: 99.9 MG/DL
PHOSPHATE 24H UR-MRATE: 849.2 MG/24 HR (ref 261–1078)
POTASSIUM 24H UR-SRATE: 36.6 MMOL/24 HR (ref 14–95)
POTASSIUM UR-SCNC: 43 MMOL/L
SODIUM 24H UR-SCNC: 130 MMOL/L
SODIUM 24H UR-SRATE: 111 MMOL/24 HR (ref 39–258)
SPECIMEN VOL 24H UR: 850 ML/24 HR (ref 600–1600)
SPECIMEN VOL 24H UR: 850 ML/24 HR (ref 600–1600)
SPECIMEN VOL ?TM UR: 850 ML
SULFATE 24H UR-SCNC: ABNORMAL MEQ/L
TRI-PHOS 24H SATFR UR: 0.01 RATIO (ref 0–1)
URATE 24H UR-MCNC: 21.9 MG/DL
URATE 24H UR-MRATE: 186 MG/24 HR (ref 174–902)
URATE DIHYD CRY STONE QL IR: 2.33 RATIO (ref 0–1.2)

## 2023-09-08 ENCOUNTER — TELEPHONE (OUTPATIENT)
Dept: UROLOGY | Age: 53
End: 2023-09-08

## 2023-09-08 NOTE — TELEPHONE ENCOUNTER
----- Message from PATRICIA Shultz CNP sent at 9/8/2023  8:34 AM EDT -----  The only abnormal lab is low urine output. She needs to drink 100 oz of water daily. This will allow for 2000 ml or more or urine output. Current output is only 850 ml.     Yola Chase

## 2023-10-02 ENCOUNTER — TELEPHONE (OUTPATIENT)
Dept: UROLOGY | Age: 53
End: 2023-10-02

## 2023-10-02 DIAGNOSIS — R10.9 LEFT FLANK PAIN: ICD-10-CM

## 2023-10-02 DIAGNOSIS — N20.0 KIDNEY STONE: Primary | ICD-10-CM

## 2023-10-02 NOTE — TELEPHONE ENCOUNTER
Referral placed to [facility]. This was urgent. She should hear from them soon. If symptoms worsen, she should go to [facility] ER for CT scan and evaluation.     Pasty Closs, PATRICIA - CNP

## 2023-10-12 ENCOUNTER — APPOINTMENT (OUTPATIENT)
Dept: CT IMAGING | Age: 53
End: 2023-10-12
Payer: COMMERCIAL

## 2023-10-12 ENCOUNTER — HOSPITAL ENCOUNTER (EMERGENCY)
Age: 53
Discharge: HOME | End: 2023-10-12
Payer: COMMERCIAL

## 2023-10-12 VITALS
OXYGEN SATURATION: 97 % | SYSTOLIC BLOOD PRESSURE: 131 MMHG | DIASTOLIC BLOOD PRESSURE: 82 MMHG | RESPIRATION RATE: 16 BRPM | HEART RATE: 80 BPM | BODY MASS INDEX: 30.7 KG/M2 | HEIGHT: 66 IN | WEIGHT: 191 LBS | TEMPERATURE: 98.1 F

## 2023-10-12 DIAGNOSIS — N13.2 HYDRONEPHROSIS WITH URINARY OBSTRUCTION DUE TO RENAL CALCULUS: ICD-10-CM

## 2023-10-12 DIAGNOSIS — N20.0 NEPHROLITHIASIS: Primary | ICD-10-CM

## 2023-10-12 LAB
ALBUMIN SERPL-MCNC: 4.4 G/DL (ref 3.5–5)
ALBUMIN/GLOB SERPL: 1.5 (ref 0.4–1.6)
ALP SERPL-CCNC: 54 U/L (ref 45–117)
ALT SERPL-CCNC: 14 U/L (ref 13–61)
ANION GAP SERPL CALC-SCNC: 13 MMOL/L (ref 2–11)
APPEARANCE UR: ABNORMAL
AST SERPL-CCNC: 14 U/L (ref 15–37)
BACTERIA URNS QL MICRO: ABNORMAL /HPF
BASOPHILS # BLD: 0.1 K/UL (ref 0–0.2)
BASOPHILS NFR BLD: 1 % (ref 0–2)
BILIRUB SERPL-MCNC: 0.4 MG/DL (ref 0.2–1.1)
BILIRUB UR QL: NEGATIVE
BUN SERPL-MCNC: 25 MG/DL (ref 6–23)
CALCIUM SERPL-MCNC: 10.1 MG/DL (ref 8.3–10.4)
CASTS URNS QL MICRO: ABNORMAL /LPF
CHLORIDE SERPL-SCNC: 105 MMOL/L (ref 98–107)
CO2 SERPL-SCNC: 23 MMOL/L (ref 21–32)
COLOR UR: YELLOW
CREAT SERPL-MCNC: 0.8 MG/DL (ref 0.6–1)
CRYSTALS URNS QL MICRO: 0 /LPF
DIFFERENTIAL METHOD BLD: ABNORMAL
EOSINOPHIL # BLD: 0.2 K/UL (ref 0–0.8)
EOSINOPHIL NFR BLD: 3 % (ref 0.5–7.8)
EPI CELLS #/AREA URNS HPF: ABNORMAL /HPF
ERYTHROCYTE [DISTWIDTH] IN BLOOD BY AUTOMATED COUNT: 12.5 % (ref 11.9–14.6)
GLOBULIN SER CALC-MCNC: 3 G/DL (ref 2.8–4.5)
GLUCOSE SERPL-MCNC: 143 MG/DL (ref 65–100)
GLUCOSE UR STRIP.AUTO-MCNC: NEGATIVE MG/DL
HCT VFR BLD AUTO: 40.2 % (ref 35.8–46.3)
HGB BLD-MCNC: 14.1 G/DL (ref 11.7–15.4)
HGB UR QL STRIP: ABNORMAL
IMM GRANULOCYTES # BLD AUTO: 0 K/UL (ref 0–0.5)
IMM GRANULOCYTES NFR BLD AUTO: 0 % (ref 0–5)
KETONES UR QL STRIP.AUTO: NEGATIVE MG/DL
LEUKOCYTE ESTERASE UR QL STRIP.AUTO: ABNORMAL
LIPASE SERPL-CCNC: 32 U/L (ref 13–60)
LYMPHOCYTES # BLD: 1.1 K/UL (ref 0.5–4.6)
LYMPHOCYTES NFR BLD: 17 % (ref 13–44)
MCH RBC QN AUTO: 32.4 PG (ref 26.1–32.9)
MCHC RBC AUTO-ENTMCNC: 35.1 G/DL (ref 31.4–35)
MCV RBC AUTO: 92.4 FL (ref 82–102)
MONOCYTES # BLD: 0.6 K/UL (ref 0.1–1.3)
MONOCYTES NFR BLD: 9 % (ref 4–12)
MUCOUS THREADS URNS QL MICRO: 0 /LPF
NEUTS SEG # BLD: 4.6 K/UL (ref 1.7–8.2)
NEUTS SEG NFR BLD: 70 % (ref 43–78)
NITRITE UR QL STRIP.AUTO: NEGATIVE
NRBC # BLD: 0 K/UL (ref 0–0.2)
OTHER OBSERVATIONS: ABNORMAL
PH UR STRIP: 5 (ref 5–9)
PLATELET # BLD AUTO: 228 K/UL (ref 150–450)
PMV BLD AUTO: 9.8 FL (ref 9.4–12.3)
POTASSIUM SERPL-SCNC: 4 MMOL/L (ref 3.5–5.1)
PROT SERPL-MCNC: 7.4 G/DL (ref 6.4–8.2)
PROT UR STRIP-MCNC: 30 MG/DL
RBC # BLD AUTO: 4.35 M/UL (ref 4.05–5.2)
RBC #/AREA URNS HPF: ABNORMAL /HPF
SODIUM SERPL-SCNC: 141 MMOL/L (ref 133–143)
SP GR UR REFRACTOMETRY: >=1.03 (ref 1–1.02)
UROBILINOGEN UR QL STRIP.AUTO: 0.2 EU/DL (ref 0.2–1)
WBC # BLD AUTO: 6.6 K/UL (ref 4.3–11.1)
WBC URNS QL MICRO: ABNORMAL /HPF

## 2023-10-12 PROCEDURE — 6360000004 HC RX CONTRAST MEDICATION

## 2023-10-12 PROCEDURE — 2580000003 HC RX 258

## 2023-10-12 PROCEDURE — 99285 EMERGENCY DEPT VISIT HI MDM: CPT

## 2023-10-12 PROCEDURE — 74177 CT ABD & PELVIS W/CONTRAST: CPT

## 2023-10-12 PROCEDURE — 96374 THER/PROPH/DIAG INJ IV PUSH: CPT

## 2023-10-12 PROCEDURE — 85025 COMPLETE CBC W/AUTO DIFF WBC: CPT

## 2023-10-12 PROCEDURE — 6360000002 HC RX W HCPCS

## 2023-10-12 PROCEDURE — 81001 URINALYSIS AUTO W/SCOPE: CPT

## 2023-10-12 PROCEDURE — 83690 ASSAY OF LIPASE: CPT

## 2023-10-12 PROCEDURE — 80053 COMPREHEN METABOLIC PANEL: CPT

## 2023-10-12 PROCEDURE — 87086 URINE CULTURE/COLONY COUNT: CPT

## 2023-10-12 RX ORDER — KETOROLAC TROMETHAMINE 15 MG/ML
15 INJECTION, SOLUTION INTRAMUSCULAR; INTRAVENOUS ONCE
Status: COMPLETED | OUTPATIENT
Start: 2023-10-12 | End: 2023-10-12

## 2023-10-12 RX ORDER — CEPHALEXIN 500 MG/1
500 CAPSULE ORAL 2 TIMES DAILY
Qty: 10 CAPSULE | Refills: 0 | Status: SHIPPED | OUTPATIENT
Start: 2023-10-12 | End: 2023-10-17

## 2023-10-12 RX ORDER — OXYCODONE HYDROCHLORIDE 5 MG/1
5 TABLET ORAL EVERY 6 HOURS PRN
Qty: 10 TABLET | Refills: 0 | Status: SHIPPED | OUTPATIENT
Start: 2023-10-12 | End: 2023-10-15

## 2023-10-12 RX ORDER — SODIUM CHLORIDE, SODIUM LACTATE, POTASSIUM CHLORIDE, AND CALCIUM CHLORIDE .6; .31; .03; .02 G/100ML; G/100ML; G/100ML; G/100ML
1000 INJECTION, SOLUTION INTRAVENOUS ONCE
Status: COMPLETED | OUTPATIENT
Start: 2023-10-12 | End: 2023-10-12

## 2023-10-12 RX ADMIN — KETOROLAC TROMETHAMINE 15 MG: 15 INJECTION, SOLUTION INTRAMUSCULAR; INTRAVENOUS at 13:42

## 2023-10-12 RX ADMIN — SODIUM CHLORIDE, POTASSIUM CHLORIDE, SODIUM LACTATE AND CALCIUM CHLORIDE 1000 ML: 600; 310; 30; 20 INJECTION, SOLUTION INTRAVENOUS at 13:41

## 2023-10-12 RX ADMIN — IOPAMIDOL 100 ML: 755 INJECTION, SOLUTION INTRAVENOUS at 14:18

## 2023-10-12 ASSESSMENT — ENCOUNTER SYMPTOMS
CHEST TIGHTNESS: 0
DIARRHEA: 0
NAUSEA: 0
VOMITING: 0
COLOR CHANGE: 0
ABDOMINAL PAIN: 1
SHORTNESS OF BREATH: 0

## 2023-10-12 ASSESSMENT — PAIN SCALES - GENERAL
PAINLEVEL_OUTOF10: 10
PAINLEVEL_OUTOF10: 5

## 2023-10-12 ASSESSMENT — PAIN DESCRIPTION - ORIENTATION: ORIENTATION: LEFT

## 2023-10-12 ASSESSMENT — PAIN DESCRIPTION - LOCATION: LOCATION: ABDOMEN

## 2023-10-12 ASSESSMENT — PAIN - FUNCTIONAL ASSESSMENT: PAIN_FUNCTIONAL_ASSESSMENT: 0-10

## 2023-10-12 NOTE — ED PROVIDER NOTES
Emergency Department Provider Note       PCP: PATRICIA Lui NP   Age: 52 y.o. Sex: female     DISPOSITION Decision To Discharge 10/12/2023 05:09:43 PM       ICD-10-CM    1. Nephrolithiasis  N20.0 oxyCODONE (ROXICODONE) 5 MG immediate release tablet      2. Hydronephrosis with urinary obstruction due to renal calculus  N13.2           Medical Decision Making     Complexity of Problems Addressed:  1 stable acute illness    Data Reviewed and Analyzed:  I independently ordered and reviewed each unique test.         I interpreted the CT Scan 2 cm left renal pelvis stone, agree with radiologist.    Discussion of management or test interpretation. Patient presents with persistent left lower quadrant pain for the past 2 to 3 months, waxing and waning in severity. No reported associated symptoms. Patient arrives mildly tachycardic at 116, otherwise vitally stable. She appears in no acute distress. She does have some tenderness of the left lower quadrant and CVA without rebound or guarding. She apparently has a known left renal stone approximately 2 cm. Will check labs, CT imaging. Labs are reassuring. UA with evidence of UTI despite presence of epithelial cells. CT did reveal a 2 cm stone within the left renal pelvis with mild hydronephrosis. This appears to be stable and unchanged as documented by urology note in September of this year. Patient was apparently referred to regional urology due to insurance but has not been unable to make an appointment yet. I discussed case with urology consultants who advised antibiotics, pain control and further urology follow-up to discuss further intervention. Her heart rate improved after fluids and pain control. We will plan for discharge. Patient understanding and agreeable. The management of this patient was discussed with an external consultant.       Risk of Complications and/or Morbidity of Patient Management:  Prescription drug management

## 2023-10-12 NOTE — DISCHARGE INSTRUCTIONS
CT did reveal a 2 cm stone lodged in the left renal pelvis causing some mild obstruction of the ureter. Urine did show signs of UTI. Will send prescription for antibiotics and short-term prescription of pain medication to the pharmacy. Plan to follow-up with urology team for further evaluation. Please return with any worsening symptoms or concerns in the interim.

## 2023-10-12 NOTE — ED TRIAGE NOTES
Pt ambulatory to triage for reports of L sided abdominal pain that started while at work this morning. Pt with hx of kidney stones. Pt has been seeing urologist for intermittent pains but has been unable to get CT scan. Pt denies blood in urine but reports some nausea.

## 2023-10-14 LAB
BACTERIA SPEC CULT: NORMAL
SERVICE CMNT-IMP: NORMAL

## 2024-02-14 ENCOUNTER — HOSPITAL ENCOUNTER (EMERGENCY)
Age: 54
Discharge: HOME | End: 2024-02-14
Attending: EMERGENCY MEDICINE
Payer: COMMERCIAL

## 2024-02-14 ENCOUNTER — APPOINTMENT (OUTPATIENT)
Dept: CT IMAGING | Age: 54
End: 2024-02-14
Payer: COMMERCIAL

## 2024-02-14 VITALS
WEIGHT: 193 LBS | RESPIRATION RATE: 16 BRPM | HEIGHT: 66 IN | TEMPERATURE: 97.7 F | BODY MASS INDEX: 31.02 KG/M2 | SYSTOLIC BLOOD PRESSURE: 119 MMHG | HEART RATE: 81 BPM | OXYGEN SATURATION: 99 % | DIASTOLIC BLOOD PRESSURE: 70 MMHG

## 2024-02-14 DIAGNOSIS — R51.9 NONINTRACTABLE EPISODIC HEADACHE, UNSPECIFIED HEADACHE TYPE: Primary | ICD-10-CM

## 2024-02-14 LAB
ALBUMIN SERPL-MCNC: 4.5 G/DL (ref 3.5–5)
ALBUMIN/GLOB SERPL: 1.5 (ref 0.4–1.6)
ALP SERPL-CCNC: 62 U/L (ref 45–117)
ALT SERPL-CCNC: 20 U/L (ref 13–61)
ANION GAP SERPL CALC-SCNC: 12 MMOL/L (ref 2–11)
AST SERPL-CCNC: 24 U/L (ref 15–37)
BASOPHILS # BLD: 0.1 K/UL (ref 0–0.2)
BASOPHILS NFR BLD: 1 % (ref 0–2)
BILIRUB SERPL-MCNC: 0.3 MG/DL (ref 0.2–1.1)
BUN SERPL-MCNC: 29 MG/DL (ref 6–23)
CALCIUM SERPL-MCNC: 10.1 MG/DL (ref 8.3–10.4)
CO2 SERPL-SCNC: 24 MMOL/L (ref 21–32)
CREAT SERPL-MCNC: 0.93 MG/DL (ref 0.6–1)
DIFFERENTIAL METHOD BLD: NORMAL
EOSINOPHIL # BLD: 0.2 K/UL (ref 0–0.8)
EOSINOPHIL NFR BLD: 2 % (ref 0.5–7.8)
ERYTHROCYTE [DISTWIDTH] IN BLOOD BY AUTOMATED COUNT: 12.8 % (ref 11.9–14.6)
GLOBULIN SER CALC-MCNC: 3.1 G/DL (ref 2.8–4.5)
GLUCOSE SERPL-MCNC: 102 MG/DL (ref 65–100)
HCT VFR BLD AUTO: 42 % (ref 35.8–46.3)
HGB BLD-MCNC: 14.3 G/DL (ref 11.7–15.4)
IMM GRANULOCYTES # BLD AUTO: 0.1 K/UL (ref 0–0.5)
IMM GRANULOCYTES NFR BLD AUTO: 1 % (ref 0–5)
LYMPHOCYTES # BLD: 2.4 K/UL (ref 0.5–4.6)
LYMPHOCYTES NFR BLD: 23 % (ref 13–44)
MAGNESIUM SERPL-MCNC: 2.2 MG/DL (ref 1.2–2.6)
MCH RBC QN AUTO: 31.7 PG (ref 26.1–32.9)
MCHC RBC AUTO-ENTMCNC: 34 G/DL (ref 31.4–35)
MCV RBC AUTO: 93.1 FL (ref 82–102)
MONOCYTES # BLD: 0.9 K/UL (ref 0.1–1.3)
MONOCYTES NFR BLD: 9 % (ref 4–12)
NEUTS SEG # BLD: 6.6 K/UL (ref 1.7–8.2)
NEUTS SEG NFR BLD: 65 % (ref 43–78)
NRBC # BLD: 0 K/UL (ref 0–0.2)
PLATELET # BLD AUTO: 261 K/UL (ref 150–450)
PMV BLD AUTO: 9.6 FL (ref 9.4–12.3)
POTASSIUM SERPL-SCNC: 4.6 MMOL/L (ref 3.5–5.1)
PROT SERPL-MCNC: 7.6 G/DL (ref 6.4–8.2)
RBC # BLD AUTO: 4.51 M/UL (ref 4.05–5.2)
SODIUM SERPL-SCNC: 138 MMOL/L (ref 133–143)
WBC # BLD AUTO: 10.2 K/UL (ref 4.3–11.1)

## 2024-02-14 PROCEDURE — 2580000003 HC RX 258: Performed by: EMERGENCY MEDICINE

## 2024-02-14 PROCEDURE — 6360000002 HC RX W HCPCS: Performed by: EMERGENCY MEDICINE

## 2024-02-14 PROCEDURE — 96374 THER/PROPH/DIAG INJ IV PUSH: CPT

## 2024-02-14 PROCEDURE — 96375 TX/PRO/DX INJ NEW DRUG ADDON: CPT

## 2024-02-14 PROCEDURE — 99284 EMERGENCY DEPT VISIT MOD MDM: CPT

## 2024-02-14 PROCEDURE — 83735 ASSAY OF MAGNESIUM: CPT

## 2024-02-14 PROCEDURE — 70450 CT HEAD/BRAIN W/O DYE: CPT

## 2024-02-14 PROCEDURE — 80053 COMPREHEN METABOLIC PANEL: CPT

## 2024-02-14 PROCEDURE — 85025 COMPLETE CBC W/AUTO DIFF WBC: CPT

## 2024-02-14 RX ORDER — KETOROLAC TROMETHAMINE 30 MG/ML
30 INJECTION, SOLUTION INTRAMUSCULAR; INTRAVENOUS
Status: COMPLETED | OUTPATIENT
Start: 2024-02-14 | End: 2024-02-14

## 2024-02-14 RX ORDER — NAPROXEN SODIUM 550 MG/1
550 TABLET ORAL 2 TIMES DAILY WITH MEALS
Qty: 30 TABLET | Refills: 0 | Status: SHIPPED | OUTPATIENT
Start: 2024-02-14

## 2024-02-14 RX ORDER — METOCLOPRAMIDE HYDROCHLORIDE 5 MG/ML
10 INJECTION INTRAMUSCULAR; INTRAVENOUS
Status: COMPLETED | OUTPATIENT
Start: 2024-02-14 | End: 2024-02-14

## 2024-02-14 RX ORDER — DIPHENHYDRAMINE HYDROCHLORIDE 50 MG/ML
12.5 INJECTION INTRAMUSCULAR; INTRAVENOUS
Status: COMPLETED | OUTPATIENT
Start: 2024-02-14 | End: 2024-02-14

## 2024-02-14 RX ORDER — 0.9 % SODIUM CHLORIDE 0.9 %
1000 INTRAVENOUS SOLUTION INTRAVENOUS
Status: COMPLETED | OUTPATIENT
Start: 2024-02-14 | End: 2024-02-14

## 2024-02-14 RX ORDER — PROCHLORPERAZINE MALEATE 10 MG
10 TABLET ORAL EVERY 6 HOURS PRN
Qty: 20 TABLET | Refills: 0 | Status: SHIPPED | OUTPATIENT
Start: 2024-02-14

## 2024-02-14 RX ADMIN — METOCLOPRAMIDE 10 MG: 5 INJECTION, SOLUTION INTRAMUSCULAR; INTRAVENOUS at 19:44

## 2024-02-14 RX ADMIN — DIPHENHYDRAMINE HYDROCHLORIDE 12.5 MG: 50 INJECTION INTRAMUSCULAR; INTRAVENOUS at 19:44

## 2024-02-14 RX ADMIN — SODIUM CHLORIDE 1000 ML: 9 INJECTION, SOLUTION INTRAVENOUS at 19:43

## 2024-02-14 RX ADMIN — KETOROLAC TROMETHAMINE 30 MG: 30 INJECTION INTRAMUSCULAR; INTRAVENOUS at 19:43

## 2024-02-15 NOTE — ED NOTES
I have reviewed discharge instructions with the patient.  The patient verbalized understanding.    Patient left ED via Discharge Method: ambulatory to Home with sister in law.    Opportunity for questions and clarification provided.       Patient given 2 scripts.         To continue your aftercare when you leave the hospital, you may receive an automated call from our care team to check in on how you are doing.  This is a free service and part of our promise to provide the best care and service to meet your aftercare needs.” If you have questions, or wish to unsubscribe from this service please call [phone].  Thank you for Choosing our [facility] Emergency Department.

## 2024-02-15 NOTE — ED PROVIDER NOTES
Albumin 4.5 3.5 - 5.0 g/dL    Globulin 3.1 2.8 - 4.5 g/dL    Albumin/Globulin Ratio 1.5 0.4 - 1.6     Magnesium   Result Value Ref Range    Magnesium 2.2 1.2 - 2.6 mg/dL        CT HEAD WO CONTRAST   Final Result   No acute intracranial hemorrhage or extra axial fluid collections.      There is left maxillary sinus disease.      All CT scans at this facility are performed using low  dose modulation   techniques as appropriate to perform exam including the following: automated   exposure control; use of iterative reconstruction technique; adjustment of the   mA and/or kV according to patient size (this includes techniques or standardized   protocols for targeted exams where dose is matched to indication/reason for   exam).                              Voice dictation software was used during the making of this note.  This software is not perfect and grammatical and other typographical errors may be present.  This note has not been completely proofread for errors.     Alden Radford MD  02/14/24 [id number]

## 2024-02-15 NOTE — DISCHARGE INSTRUCTIONS
Take medications as prescribed  Drink plenty of fluids  Follow-up with your primary care physician  Return to the ER for any new, worsening or life-threatening symptoms

## 2024-02-15 NOTE — ED TRIAGE NOTES
Pt ambulatory to room 6 with unsteady gait with c/o headache onset 1130 this morning. Pt reports taking percocet around 1130 this morning and tylenol around 1530 with no relief. Pt reports nausea throughout the day. Pt states she felt like she was going to pass out while at work. Pt seen by pain management for spinal stenosis.

## (undated) DEVICE — GUIDEWIRE ENDOSCP L150CM DIA0.038IN TIP L3CM PTFE FLX STR

## (undated) DEVICE — GLOVE SURG SZ 8 CRM LTX FREE POLYISOPRENE POLYMER BEAD ANTI

## (undated) DEVICE — SOLUTION IRRIG 3000ML 0.9% SOD CHL USP UROMATIC PLAS CONT

## (undated) DEVICE — CATHETER NEPHSTMY 7FR L55CM HI PRSS BLLN 30FR L12CM SHTH

## (undated) DEVICE — HIGH PRESSURE NEPHROSTOMY BALLOON CATHETER: Brand: NEPHROMAX

## (undated) DEVICE — STONE RETRIEVAL BASKET: Brand: SEGURA HEMISPHERE

## (undated) DEVICE — PROBE LITHO 12FR L3.76MM DISP SHOCKPULSE

## (undated) DEVICE — BAG,DRAINAGE,4L,A/R TOWER,LL,SLIDE TAP: Brand: MEDLINE

## (undated) DEVICE — SHEET,DRAPE,53X77,STERILE: Brand: MEDLINE

## (undated) DEVICE — DISK EXT FIX TENS

## (undated) DEVICE — SUTURE ETHLN SZ 2-0 L18IN NONABSORBABLE BLK L26MM PS 3/8 585H

## (undated) DEVICE — CATHETER URETH BLLN 5CC 20FR F 2 W SPEC COUNCL MOD SHT OPN

## (undated) DEVICE — Y-TYPE TUR/BLADDER IRRIGATION SET, REGULATING CLAMP

## (undated) DEVICE — MINOR SPLIT GENERAL: Brand: MEDLINE INDUSTRIES, INC.

## (undated) DEVICE — PAD,ABDOMINAL,5"X9",ST,LF,25/BX: Brand: MEDLINE INDUSTRIES, INC.

## (undated) DEVICE — GUIDEWIRE ENDOSCP L150CM OD035IN PTFE STR MOVABLE COR

## (undated) DEVICE — APPLICATOR MEDICATED 26 CC SOLUTION HI LT ORNG CHLORAPREP

## (undated) DEVICE — INFLATION DEVICE: Brand: ENCORE™ 26

## (undated) DEVICE — CATHETER URETH 22FR BLLN 5CC STD LTX 2 W TWO OPP DRNGE EYE

## (undated) DEVICE — GUIDEWIRE ENDOSCP J 0.038 INX145 CM SUPER STIFF PTFE AMPLATZ

## (undated) DEVICE — SYRINGE CATH TIP 50ML

## (undated) DEVICE — GUIDEWIRE VASC L180CM DIA0.035IN TIP L7CM PTFE S STL STR

## (undated) DEVICE — JELLY LUBRICATING 10GM PREFIL SYR LUBE

## (undated) DEVICE — DILATOR/SHEATH SET: Brand: 8/10 DILATOR/SHEATH SET

## (undated) DEVICE — GUIDEWIRE URO L145CM DIA0.035IN TIP L7CM S STL PTFE BENT

## (undated) DEVICE — TOBRA BONE BASKET- AUTOGRAFT BONE COLLECTION SYSTEM WITH MESH FILTER AND GRAFT COMPRESSOR: Brand: TOBRA BONE BASKET

## (undated) DEVICE — DRAPE PT ISOLATN 130 IN X 96 IN

## (undated) DEVICE — INTENDED FOR TISSUE SEPARATION, AND OTHER PROCEDURES THAT REQUIRE A SHARP SURGICAL BLADE TO PUNCTURE OR CUT.: Brand: BARD-PARKER ® STAINLESS STEEL BLADES

## (undated) DEVICE — TRAY,URINE METER,100% SILICONE,16FR10ML: Brand: MEDLINE

## (undated) DEVICE — 3M™ IOBAN™ 2 ANTIMICROBIAL INCISE DRAPE 6650EZ: Brand: IOBAN™ 2

## (undated) DEVICE — GAUZE,SPONGE,4"X4",16PLY,STRL,LF,10/TRAY: Brand: MEDLINE

## (undated) DEVICE — C-ARM: Brand: UNBRANDED

## (undated) DEVICE — TUBING, SUCTION, 1/4" X 10', STRAIGHT: Brand: MEDLINE